# Patient Record
Sex: FEMALE | Race: WHITE | Employment: OTHER | URBAN - METROPOLITAN AREA
[De-identification: names, ages, dates, MRNs, and addresses within clinical notes are randomized per-mention and may not be internally consistent; named-entity substitution may affect disease eponyms.]

---

## 2017-06-01 ENCOUNTER — HOSPITAL ENCOUNTER (OUTPATIENT)
Dept: LAB | Age: 72
Discharge: HOME | End: 2017-06-01
Payer: MEDICARE

## 2017-06-01 LAB
ANION GAP SERPL CALCULATED.3IONS-SCNC: 13 MEQ/L (ref 7–13)
BUN BLDV-MCNC: 13 MG/DL (ref 8–23)
CALCIUM SERPL-MCNC: 9.2 MG/DL (ref 8.6–10.2)
CHLORIDE BLD-SCNC: 99 MEQ/L (ref 98–107)
CO2: 25 MEQ/L (ref 22–29)
CREAT SERPL-MCNC: 0.76 MG/DL (ref 0.5–0.9)
GFR AFRICAN AMERICAN: >60
GFR NON-AFRICAN AMERICAN: >60
GLUCOSE BLD-MCNC: 94 MG/DL (ref 74–109)
POTASSIUM SERPL-SCNC: 3.7 MEQ/L (ref 3.5–5.1)
SODIUM BLD-SCNC: 137 MEQ/L (ref 132–144)
TSH SERPL DL<=0.05 MIU/L-ACNC: 2.65 UIU/ML (ref 0.27–4.2)

## 2017-06-01 PROCEDURE — 36415 COLL VENOUS BLD VENIPUNCTURE: CPT

## 2017-06-01 PROCEDURE — 84443 ASSAY THYROID STIM HORMONE: CPT

## 2017-06-01 PROCEDURE — 80048 BASIC METABOLIC PNL TOTAL CA: CPT

## 2017-07-08 ENCOUNTER — APPOINTMENT (OUTPATIENT)
Dept: GENERAL RADIOLOGY | Age: 72
End: 2017-07-08
Payer: MEDICARE

## 2017-07-08 ENCOUNTER — HOSPITAL ENCOUNTER (EMERGENCY)
Age: 72
Discharge: ANOTHER HOSPITAL | End: 2017-07-09
Attending: EMERGENCY MEDICINE
Payer: MEDICARE

## 2017-07-08 DIAGNOSIS — R07.9 CHEST PAIN, UNSPECIFIED TYPE: Primary | ICD-10-CM

## 2017-07-08 DIAGNOSIS — R00.1 BRADYCARDIA: ICD-10-CM

## 2017-07-08 PROCEDURE — 99285 EMERGENCY DEPT VISIT HI MDM: CPT

## 2017-07-08 PROCEDURE — 93005 ELECTROCARDIOGRAM TRACING: CPT

## 2017-07-08 PROCEDURE — 71010 XR CHEST PORTABLE: CPT

## 2017-07-08 PROCEDURE — 84484 ASSAY OF TROPONIN QUANT: CPT

## 2017-07-08 PROCEDURE — 85025 COMPLETE CBC W/AUTO DIFF WBC: CPT

## 2017-07-08 PROCEDURE — 36415 COLL VENOUS BLD VENIPUNCTURE: CPT

## 2017-07-08 PROCEDURE — 80048 BASIC METABOLIC PNL TOTAL CA: CPT

## 2017-07-08 PROCEDURE — 83735 ASSAY OF MAGNESIUM: CPT

## 2017-07-08 PROCEDURE — 85379 FIBRIN DEGRADATION QUANT: CPT

## 2017-07-08 PROCEDURE — 83880 ASSAY OF NATRIURETIC PEPTIDE: CPT

## 2017-07-08 RX ORDER — NITROGLYCERIN 0.4 MG/1
0.4 TABLET SUBLINGUAL EVERY 5 MIN PRN
Status: DISCONTINUED | OUTPATIENT
Start: 2017-07-08 | End: 2017-07-09 | Stop reason: HOSPADM

## 2017-07-08 RX ORDER — 0.9 % SODIUM CHLORIDE 0.9 %
1000 INTRAVENOUS SOLUTION INTRAVENOUS ONCE
Status: COMPLETED | OUTPATIENT
Start: 2017-07-08 | End: 2017-07-09

## 2017-07-08 RX ORDER — ASPIRIN 81 MG/1
324 TABLET, CHEWABLE ORAL ONCE
Status: COMPLETED | OUTPATIENT
Start: 2017-07-08 | End: 2017-07-09

## 2017-07-08 ASSESSMENT — ENCOUNTER SYMPTOMS
RHINORRHEA: 0
CONSTIPATION: 0
SINUS PRESSURE: 0
PHOTOPHOBIA: 0
WHEEZING: 0
APNEA: 0
VOMITING: 0
SHORTNESS OF BREATH: 0
NAUSEA: 0
BACK PAIN: 0
DIARRHEA: 0
ABDOMINAL PAIN: 0
ABDOMINAL DISTENTION: 0
SORE THROAT: 0
COUGH: 0
COLOR CHANGE: 0
EYE PAIN: 0

## 2017-07-08 ASSESSMENT — PAIN DESCRIPTION - ONSET: ONSET: SUDDEN

## 2017-07-08 ASSESSMENT — PAIN DESCRIPTION - PAIN TYPE: TYPE: ACUTE PAIN

## 2017-07-08 ASSESSMENT — PAIN DESCRIPTION - PROGRESSION: CLINICAL_PROGRESSION: GRADUALLY WORSENING

## 2017-07-08 ASSESSMENT — PAIN SCALES - GENERAL: PAINLEVEL_OUTOF10: 6

## 2017-07-08 ASSESSMENT — PAIN DESCRIPTION - FREQUENCY: FREQUENCY: CONTINUOUS

## 2017-07-08 ASSESSMENT — PAIN DESCRIPTION - DESCRIPTORS: DESCRIPTORS: ACHING

## 2017-07-08 ASSESSMENT — PAIN DESCRIPTION - LOCATION: LOCATION: CHEST

## 2017-07-08 ASSESSMENT — PAIN DESCRIPTION - ORIENTATION: ORIENTATION: LEFT

## 2017-07-09 ENCOUNTER — APPOINTMENT (OUTPATIENT)
Dept: ULTRASOUND IMAGING | Age: 72
DRG: 313 | End: 2017-07-09
Attending: INTERNAL MEDICINE
Payer: MEDICARE

## 2017-07-09 ENCOUNTER — HOSPITAL ENCOUNTER (INPATIENT)
Age: 72
LOS: 1 days | Discharge: HOME | DRG: 313 | End: 2017-07-10
Attending: INTERNAL MEDICINE | Admitting: INTERNAL MEDICINE
Payer: MEDICARE

## 2017-07-09 VITALS
HEART RATE: 54 BPM | BODY MASS INDEX: 28.35 KG/M2 | HEIGHT: 63 IN | DIASTOLIC BLOOD PRESSURE: 69 MMHG | TEMPERATURE: 98.6 F | OXYGEN SATURATION: 95 % | RESPIRATION RATE: 16 BRPM | SYSTOLIC BLOOD PRESSURE: 159 MMHG | WEIGHT: 160 LBS

## 2017-07-09 LAB
ANION GAP SERPL CALCULATED.3IONS-SCNC: 14 MEQ/L (ref 7–13)
BACTERIA: ABNORMAL /HPF
BASOPHILS ABSOLUTE: 0.1 K/UL (ref 0–0.2)
BASOPHILS RELATIVE PERCENT: 1.5 %
BILIRUBIN URINE: NEGATIVE
BLOOD, URINE: NORMAL
BUN BLDV-MCNC: 16 MG/DL (ref 8–23)
CALCIUM SERPL-MCNC: 9.3 MG/DL (ref 8.6–10.2)
CHLORIDE BLD-SCNC: 93 MEQ/L (ref 98–107)
CHOLESTEROL, TOTAL: 146 MG/DL (ref 0–199)
CLARITY: CLEAR
CO2: 28 MEQ/L (ref 22–29)
COLOR: YELLOW
CREAT SERPL-MCNC: 0.93 MG/DL (ref 0.5–0.9)
D DIMER: 0.57 MG/L FEU (ref 0–0.5)
EOSINOPHILS ABSOLUTE: 0.2 K/UL (ref 0–0.7)
EOSINOPHILS RELATIVE PERCENT: 2.7 %
EPITHELIAL CELLS, UA: ABNORMAL /HPF
GFR AFRICAN AMERICAN: >60
GFR NON-AFRICAN AMERICAN: 59.1
GLUCOSE BLD-MCNC: 117 MG/DL (ref 74–109)
GLUCOSE URINE: NEGATIVE MG/DL
HCT VFR BLD CALC: 37.6 % (ref 37–47)
HDLC SERPL-MCNC: 80 MG/DL (ref 40–59)
HEMOGLOBIN: 12.7 G/DL (ref 12–16)
KETONES, URINE: NEGATIVE MG/DL
LDL CHOLESTEROL CALCULATED: 52 MG/DL (ref 0–129)
LEUKOCYTE ESTERASE, URINE: NORMAL
LYMPHOCYTES ABSOLUTE: 2.4 K/UL (ref 1–4.8)
LYMPHOCYTES RELATIVE PERCENT: 34.8 %
MAGNESIUM: 2 MG/DL (ref 1.7–2.3)
MCH RBC QN AUTO: 31.2 PG (ref 27–31.3)
MCHC RBC AUTO-ENTMCNC: 33.8 % (ref 33–37)
MCV RBC AUTO: 92.3 FL (ref 82–100)
MONOCYTES ABSOLUTE: 0.4 K/UL (ref 0.2–0.8)
MONOCYTES RELATIVE PERCENT: 6.6 %
NEUTROPHILS ABSOLUTE: 3.7 K/UL (ref 1.4–6.5)
NEUTROPHILS RELATIVE PERCENT: 54.4 %
NITRITE, URINE: NEGATIVE
PDW BLD-RTO: 14 % (ref 11.5–14.5)
PH UA: 5.5 (ref 5–9)
PLATELET # BLD: 261 K/UL (ref 130–400)
POTASSIUM SERPL-SCNC: 3.5 MEQ/L (ref 3.5–5.1)
PRO-BNP: 98 PG/ML
PROTEIN UA: NEGATIVE MG/DL
RBC # BLD: 4.07 M/UL (ref 4.2–5.4)
RBC UA: ABNORMAL /HPF (ref 0–2)
RENAL EPITHELIAL, UA: ABNORMAL /HPF
SODIUM BLD-SCNC: 135 MEQ/L (ref 132–144)
SPECIFIC GRAVITY UA: <=1.005 (ref 1–1.03)
TRIGL SERPL-MCNC: 70 MG/DL (ref 0–200)
TROPONIN: <0.01 NG/ML (ref 0–0.01)
URINE REFLEX TO CULTURE: YES
UROBILINOGEN, URINE: 0.2 E.U./DL
WBC # BLD: 6.8 K/UL (ref 4.8–10.8)
WBC UA: ABNORMAL /HPF (ref 0–5)

## 2017-07-09 PROCEDURE — 93005 ELECTROCARDIOGRAM TRACING: CPT

## 2017-07-09 PROCEDURE — 6370000000 HC RX 637 (ALT 250 FOR IP): Performed by: INTERNAL MEDICINE

## 2017-07-09 PROCEDURE — 81001 URINALYSIS AUTO W/SCOPE: CPT

## 2017-07-09 PROCEDURE — 2060000000 HC ICU INTERMEDIATE R&B

## 2017-07-09 PROCEDURE — 36415 COLL VENOUS BLD VENIPUNCTURE: CPT

## 2017-07-09 PROCEDURE — 93970 EXTREMITY STUDY: CPT

## 2017-07-09 PROCEDURE — 6370000000 HC RX 637 (ALT 250 FOR IP): Performed by: EMERGENCY MEDICINE

## 2017-07-09 PROCEDURE — 87086 URINE CULTURE/COLONY COUNT: CPT

## 2017-07-09 PROCEDURE — 2140000000 HC CCU INTERMEDIATE R&B

## 2017-07-09 PROCEDURE — 2580000003 HC RX 258: Performed by: EMERGENCY MEDICINE

## 2017-07-09 PROCEDURE — 84484 ASSAY OF TROPONIN QUANT: CPT

## 2017-07-09 PROCEDURE — 80061 LIPID PANEL: CPT

## 2017-07-09 RX ORDER — BUPROPION HYDROCHLORIDE 150 MG/1
150 TABLET ORAL EVERY MORNING
Status: DISCONTINUED | OUTPATIENT
Start: 2017-07-09 | End: 2017-07-11 | Stop reason: HOSPADM

## 2017-07-09 RX ORDER — DIPHENHYDRAMINE HCL 25 MG
25 TABLET ORAL ONCE
Status: COMPLETED | OUTPATIENT
Start: 2017-07-09 | End: 2017-07-09

## 2017-07-09 RX ORDER — CLOPIDOGREL BISULFATE 75 MG/1
50 TABLET ORAL ONCE
Status: DISCONTINUED | OUTPATIENT
Start: 2017-07-09 | End: 2017-07-09

## 2017-07-09 RX ORDER — FAMOTIDINE 20 MG/1
40 TABLET, FILM COATED ORAL ONCE
Status: COMPLETED | OUTPATIENT
Start: 2017-07-10 | End: 2017-07-10

## 2017-07-09 RX ORDER — MORPHINE SULFATE 4 MG/ML
4 INJECTION, SOLUTION INTRAMUSCULAR; INTRAVENOUS EVERY 4 HOURS PRN
Status: DISCONTINUED | OUTPATIENT
Start: 2017-07-09 | End: 2017-07-11 | Stop reason: HOSPADM

## 2017-07-09 RX ORDER — LOSARTAN POTASSIUM 50 MG/1
50 TABLET ORAL DAILY
Status: DISCONTINUED | OUTPATIENT
Start: 2017-07-09 | End: 2017-07-11 | Stop reason: HOSPADM

## 2017-07-09 RX ORDER — DIPHENHYDRAMINE HCL 25 MG
25 TABLET ORAL ONCE
Status: COMPLETED | OUTPATIENT
Start: 2017-07-10 | End: 2017-07-10

## 2017-07-09 RX ORDER — PREDNISONE 20 MG/1
20 TABLET ORAL DAILY
Status: DISCONTINUED | OUTPATIENT
Start: 2017-07-09 | End: 2017-07-11 | Stop reason: HOSPADM

## 2017-07-09 RX ORDER — ALPRAZOLAM 0.25 MG/1
0.25 TABLET ORAL 2 TIMES DAILY PRN
Status: DISCONTINUED | OUTPATIENT
Start: 2017-07-09 | End: 2017-07-11 | Stop reason: HOSPADM

## 2017-07-09 RX ORDER — FAMOTIDINE 20 MG/1
40 TABLET, FILM COATED ORAL ONCE
Status: COMPLETED | OUTPATIENT
Start: 2017-07-09 | End: 2017-07-09

## 2017-07-09 RX ORDER — PREDNISONE 20 MG/1
20 TABLET ORAL ONCE
Status: COMPLETED | OUTPATIENT
Start: 2017-07-10 | End: 2017-07-10

## 2017-07-09 RX ORDER — CLOPIDOGREL BISULFATE 75 MG/1
75 TABLET ORAL DAILY
Status: DISCONTINUED | OUTPATIENT
Start: 2017-07-09 | End: 2017-07-11 | Stop reason: HOSPADM

## 2017-07-09 RX ORDER — ZOLPIDEM TARTRATE 5 MG/1
5 TABLET ORAL ONCE
Status: COMPLETED | OUTPATIENT
Start: 2017-07-09 | End: 2017-07-09

## 2017-07-09 RX ORDER — SIMVASTATIN 20 MG
20 TABLET ORAL NIGHTLY
Status: DISCONTINUED | OUTPATIENT
Start: 2017-07-09 | End: 2017-07-11 | Stop reason: HOSPADM

## 2017-07-09 RX ORDER — ASPIRIN 81 MG/1
81 TABLET, CHEWABLE ORAL DAILY
Status: DISCONTINUED | OUTPATIENT
Start: 2017-07-09 | End: 2017-07-11 | Stop reason: HOSPADM

## 2017-07-09 RX ADMIN — BUPROPION HYDROCHLORIDE 150 MG: 150 TABLET, FILM COATED, EXTENDED RELEASE ORAL at 08:17

## 2017-07-09 RX ADMIN — Medication 15 ML: at 12:27

## 2017-07-09 RX ADMIN — FAMOTIDINE 40 MG: 20 TABLET ORAL at 20:23

## 2017-07-09 RX ADMIN — ASPIRIN 81 MG 81 MG: 81 TABLET ORAL at 08:18

## 2017-07-09 RX ADMIN — SIMVASTATIN 20 MG: 20 TABLET, FILM COATED ORAL at 20:24

## 2017-07-09 RX ADMIN — DIPHENHYDRAMINE HCL 25 MG: 25 TABLET ORAL at 20:24

## 2017-07-09 RX ADMIN — ALPRAZOLAM 0.25 MG: 0.25 TABLET ORAL at 18:20

## 2017-07-09 RX ADMIN — CLOPIDOGREL BISULFATE 75 MG: 75 TABLET ORAL at 08:18

## 2017-07-09 RX ADMIN — LOSARTAN POTASSIUM 50 MG: 50 TABLET, FILM COATED ORAL at 08:18

## 2017-07-09 RX ADMIN — PREDNISONE 20 MG: 20 TABLET ORAL at 18:20

## 2017-07-09 RX ADMIN — SODIUM CHLORIDE 1000 ML: 9 INJECTION, SOLUTION INTRAVENOUS at 00:00

## 2017-07-09 RX ADMIN — ZOLPIDEM TARTRATE 5 MG: 5 TABLET ORAL at 23:32

## 2017-07-09 RX ADMIN — ASPIRIN 81 MG CHEWABLE TABLET 324 MG: 81 TABLET CHEWABLE at 00:00

## 2017-07-09 ASSESSMENT — PAIN DESCRIPTION - PROGRESSION: CLINICAL_PROGRESSION: NOT CHANGED

## 2017-07-09 ASSESSMENT — PAIN SCALES - GENERAL
PAINLEVEL_OUTOF10: 0
PAINLEVEL_OUTOF10: 1
PAINLEVEL_OUTOF10: 0
PAINLEVEL_OUTOF10: 0
PAINLEVEL_OUTOF10: 2
PAINLEVEL_OUTOF10: 0
PAINLEVEL_OUTOF10: 1
PAINLEVEL_OUTOF10: 0

## 2017-07-09 ASSESSMENT — ENCOUNTER SYMPTOMS
RESPIRATORY NEGATIVE: 1
GASTROINTESTINAL NEGATIVE: 1
EYES NEGATIVE: 1
ORTHOPNEA: 0

## 2017-07-09 ASSESSMENT — PAIN DESCRIPTION - FREQUENCY: FREQUENCY: INTERMITTENT

## 2017-07-09 ASSESSMENT — PAIN DESCRIPTION - ONSET: ONSET: GRADUAL

## 2017-07-09 ASSESSMENT — PAIN DESCRIPTION - PAIN TYPE: TYPE: ACUTE PAIN

## 2017-07-09 ASSESSMENT — PAIN DESCRIPTION - LOCATION: LOCATION: CHEST

## 2017-07-09 NOTE — ED PROVIDER NOTES
[address] [place] [address] ED  eMERGENCY dEPARTMENT eNCOUnter      Pt Name: Zafar Harden  MRN: [id number]  [place] 1945  Date of evaluation: 7/8/2017  Provider: Junaid Ellison MD    CHIEF COMPLAINT       Chief Complaint   Patient presents with    Chest Pain         HISTORY OF PRESENT ILLNESS   (Location/Symptom, Timing/Onset, Context/Setting, Quality, Duration, Modifying Factors, Severity)  Note limiting factors. Zafar Harden is a 67 y.o. female who presents to the emergency department with complaint of chest pain which began about 2 hours ago. Patient describes pain as sharp and under her left breast.  She denies any radiation of pain. She denied associated shortness of breath, palpitations, diaphoresis or dizziness. She had any problems with chest pain in the past.  She quit smoking about 20 years ago. Pain is persistent,  6 in a scale of 1-10. HPI    Nursing Notes were reviewed. REVIEW OF SYSTEMS    (2-9 systems for level 4, 10 or more for level 5)     Review of Systems   Constitutional: Negative. Negative for activity change, appetite change, chills, fatigue and fever. HENT: Negative for congestion, ear discharge, ear pain, hearing loss, rhinorrhea, sinus pressure and sore throat. Eyes: Negative for photophobia, pain and visual disturbance. Respiratory: Negative for apnea, cough, shortness of breath and wheezing. Cardiovascular: Positive for chest pain. Negative for palpitations and leg swelling. Gastrointestinal: Negative for abdominal distention, abdominal pain, constipation, diarrhea, nausea and vomiting. Endocrine: Negative for cold intolerance, heat intolerance and polyuria. Genitourinary: Negative for dysuria, flank pain, frequency and urgency. Musculoskeletal: Negative for arthralgias, back pain, gait problem, myalgias and neck stiffness. Skin: Negative for color change, pallor and rash.    Allergic/Immunologic: Negative for food allergies and immunocompromised state. Neurological: Negative for dizziness, tremors, syncope, weakness, light-headedness and headaches. Psychiatric/Behavioral: Negative for agitation, confusion and hallucinations. All other systems reviewed and are negative. Except as noted above the remainder of the review of systems was reviewed and negative. PAST MEDICAL HISTORY     Past Medical History:   Diagnosis Date    Chronic cough     Depression     Diverticulosis     Essential hypertension     somewhat labile, but overall stable    Hyperlipidemia     Type2A    Low bone mass     spine    Menopause     Reflux     chronic with periodic dysphagia    Sedentary lifestyle     Sleep apnea          SURGICAL HISTORY       Past Surgical History:   Procedure Laterality Date    CERVICAL DISC SURGERY      ESOPHAGEAL DILATATION  095462    LUMBAR [place] [employer] SURGERY  326018    TONSILLECTOMY           CURRENT MEDICATIONS       Discharge Medication List as of 7/9/2017  3:26 AM      CONTINUE these medications which have NOT CHANGED    Details   Clopidogrel Bisulfate (PLAVIX PO) Take 50 mg by mouth       losartan (COZAAR) 50 MG tablet R-0      traZODone (DESYREL) 50 MG tablet R-9      Melatonin 10 MG CAPS Take by mouth      CREON 31448 UNITS CPEP R-3, LAINA      CARAFATE 1 GM/10ML suspension R-0, LAINA      potassium chloride (POTASSIUM CHLORIDE) 10 MEQ tablet Take 1 tablet by mouth daily. , Disp-90 tablet, R-3      traMADol (ULTRAM) 50 MG tablet Take 1 tablet by mouth every 8 hours as needed for Pain., Disp-40 tablet, R-1      Calcium Carbonate-Vitamin D (CALCIUM 600+D) 600-200 MG-UNIT TABS Take  by mouth 2 times daily. omeprazole (PRILOSEC) 40 MG capsule Take 40 mg by mouth daily.         loperamide (IMODIUM A-D) 2 MG tablet Take 2 mg by mouth      simvastatin (ZOCOR) 20 MG tablet R-1      predniSONE (DELTASONE) 10 MG tablet 1 tab TID x 3 days, 1 tab BID x 3 days, 1 tab QD x 3 days, Disp-18 tablet, R-0      amitriptyline (ELAVIL) 10 MG tablet Take 1 tablet by mouth nightly., Disp-90 tablet, R-3      hydrochlorothiazide (HYDRODIURIL) 25 MG tablet Take 1 tablet by mouth daily. , Disp-90 tablet, R-3      LORazepam (ATIVAN) 0.5 MG tablet Take 1 tablet by mouth daily as needed for Anxiety. , Disp-30 tablet, R-1      buPROPion (WELLBUTRIN XL) 300 MG XL tablet Take 1 tablet by mouth every morning., Disp-30 tablet, R-2      busPIRone (BUSPAR) 15 MG tablet Take 1 tablet by mouth 2 times daily. , Disp-60 tablet, R-0      vitamin D (ERGOCALCIFEROL) 25812 UNITS CAPS capsule Take 1 capsule by mouth every 14 days. , Disp-6 capsule, R-1      ALPRAZolam (XANAX) 0.5 MG tablet Take 0.5 tablets by mouth 2 times daily as needed for Anxiety. , Disp-30 tablet, R-2      zolpidem (AMBIEN) 5 MG tablet One hs prn insomnia., Disp-10 tablet, R-1      gabapentin (NEURONTIN) 400 MG capsule Take 400 mg by mouth See Admin Instructions. 2 qhs              ALLERGIES     Betadine [povidone iodine]; Celebrex [celecoxib]; Contrast [iodides]; Mercury;  Other; Pcn [penicillins]; and Zinc    FAMILY HISTORY       Family History   Problem Relation Age of Onset    Arthritis Mother     Cancer Father      breast    Memory Loss Father      short term          SOCIAL HISTORY       Social History     Social History    Marital status:      Spouse name: N/A    Number of children: N/A    Years of education: N/A     Social History Main Topics    Smoking status: Former Smoker     Quit date: 1/1/1997    Smokeless tobacco: Never Used    Alcohol use 1.2 oz/week     2 Glasses of wine per week      Comment: Daily    Drug use: No    Sexual activity: Not Asked     Other Topics Concern    None     Social History Narrative       SCREENINGS             PHYSICAL EXAM    (up to 7 for level 4, 8 or more for level 5)   ED Triage Vitals   BP Temp Temp Source Pulse Resp SpO2 Height Weight   07/08/17 2311 07/08/17 2311 07/08/17 2311 07/08/17 2311 07/08/17 2311 07/08/17 2311 07/08/17 2311 wave changes. RADIOLOGY:   Non-plain film images such as CT, Ultrasound and MRI are read by the radiologist. Plain radiographic images are visualized and preliminarily interpreted by the emergency physician with the below findings:    Chest x-ray showed no acute cardio pulmonary pathology    Interpretation per the Radiologist below, if available at the time of this note:    XR Chest Portable   Final Result   NO ACUTE CARDIOPULMONARY ABNORMALITY. ED BEDSIDE ULTRASOUND:   Performed by ED Physician - none    LABS:  Labs Reviewed   BASIC METABOLIC PANEL - Abnormal; Notable for the following:        Result Value    Chloride 93 (*)     Anion Gap 14 (*)     Glucose 117 (*)     CREATININE 0.93 (*)     GFR Non- 59.1 (*)     All other components within normal limits   CBC WITH AUTO DIFFERENTIAL - Abnormal; Notable for the following:     RBC 4.07 (*)     All other components within normal limits   D-DIMER, QUANTITATIVE - Abnormal; Notable for the following:     D-Dimer, Quant 0.57 (*)     All other components within normal limits   MICROSCOPIC URINALYSIS - Abnormal; Notable for the following:     WBC, UA 10-20 (*)     RBC, UA 10-20 (*)     All other components within normal limits   URINE CULTURE    Narrative:     ORDER#: [id number]                          ORDERED BY: Raghav Story  SOURCE: Urine Clean Catch                  COLLECTED:  07/09/17 00:35  ANTIBIOTICS AT DEVEN.:                      RECEIVED :  07/09/17 16:10   MAGNESIUM   URINE RT REFLEX TO CULTURE   TROPONIN   BRAIN NATRIURETIC PEPTIDE       All other labs were within normal range or not returned as of this dictation. EMERGENCY DEPARTMENT COURSE and DIFFERENTIAL DIAGNOSIS/MDM:    Patient's chest pain resolved with nitro sublingual .  Heart rate dropped in the 30s during  ED course . Case was discussed with Dr. Sangeetha Hawthorne hospitalist [facility].    She was accepted in transfer to  the ICU for continuing care .     Care

## 2017-07-09 NOTE — ED TRIAGE NOTES
Pt presents to ED c/o chest pain that started tonight about 1930. Pt denies any shortness of breath or nausea. Patient alert and oriented x4 and speaking in complete sentence with regular unlabored respirations during triage.

## 2017-07-09 NOTE — ED NOTES
Emmy Milner with [facility] called and gave me the bed assignment ICU bed 8-1 [id number] [address] [facility], nurse report [phone] at 2:10.     Edda Castro  07/09/17 021

## 2017-07-10 ENCOUNTER — APPOINTMENT (OUTPATIENT)
Dept: CT IMAGING | Age: 72
DRG: 313 | End: 2017-07-10
Attending: INTERNAL MEDICINE
Payer: MEDICARE

## 2017-07-10 ENCOUNTER — APPOINTMENT (OUTPATIENT)
Dept: NUCLEAR MEDICINE | Age: 72
DRG: 313 | End: 2017-07-10
Attending: INTERNAL MEDICINE
Payer: MEDICARE

## 2017-07-10 VITALS
RESPIRATION RATE: 18 BRPM | BODY MASS INDEX: 30.2 KG/M2 | SYSTOLIC BLOOD PRESSURE: 150 MMHG | OXYGEN SATURATION: 97 % | DIASTOLIC BLOOD PRESSURE: 51 MMHG | HEIGHT: 63 IN | HEART RATE: 94 BPM | TEMPERATURE: 98.1 F | WEIGHT: 170.42 LBS

## 2017-07-10 LAB
LV EF: 50 %
LVEF MODALITY: NORMAL

## 2017-07-10 PROCEDURE — 78452 HT MUSCLE IMAGE SPECT MULT: CPT

## 2017-07-10 PROCEDURE — 6370000000 HC RX 637 (ALT 250 FOR IP): Performed by: INTERNAL MEDICINE

## 2017-07-10 PROCEDURE — 6360000002 HC RX W HCPCS: Performed by: INTERNAL MEDICINE

## 2017-07-10 PROCEDURE — 6360000004 HC RX CONTRAST MEDICATION: Performed by: RADIOLOGY

## 2017-07-10 PROCEDURE — A9502 TC99M TETROFOSMIN: HCPCS | Performed by: INTERNAL MEDICINE

## 2017-07-10 PROCEDURE — 93017 CV STRESS TEST TRACING ONLY: CPT

## 2017-07-10 PROCEDURE — 93306 TTE W/DOPPLER COMPLETE: CPT

## 2017-07-10 PROCEDURE — 3430000000 HC RX DIAGNOSTIC RADIOPHARMACEUTICAL: Performed by: INTERNAL MEDICINE

## 2017-07-10 PROCEDURE — 2580000003 HC RX 258: Performed by: INTERNAL MEDICINE

## 2017-07-10 PROCEDURE — 71275 CT ANGIOGRAPHY CHEST: CPT

## 2017-07-10 RX ORDER — SODIUM CHLORIDE 0.9 % (FLUSH) 0.9 %
10 SYRINGE (ML) INJECTION 2 TIMES DAILY
Status: DISCONTINUED | OUTPATIENT
Start: 2017-07-10 | End: 2017-07-11 | Stop reason: HOSPADM

## 2017-07-10 RX ADMIN — PREDNISONE 20 MG: 20 TABLET ORAL at 06:05

## 2017-07-10 RX ADMIN — CLOPIDOGREL BISULFATE 75 MG: 75 TABLET ORAL at 08:08

## 2017-07-10 RX ADMIN — ALPRAZOLAM 0.25 MG: 0.25 TABLET ORAL at 15:58

## 2017-07-10 RX ADMIN — TETROFOSMIN 34.7 MILLICURIE: 0.23 INJECTION, POWDER, LYOPHILIZED, FOR SOLUTION INTRAVENOUS at 10:39

## 2017-07-10 RX ADMIN — REGADENOSON 0.4 MG: 0.08 INJECTION, SOLUTION INTRAVENOUS at 10:39

## 2017-07-10 RX ADMIN — BUPROPION HYDROCHLORIDE 150 MG: 150 TABLET, FILM COATED, EXTENDED RELEASE ORAL at 08:08

## 2017-07-10 RX ADMIN — ASPIRIN 81 MG 81 MG: 81 TABLET ORAL at 08:08

## 2017-07-10 RX ADMIN — PREDNISONE 20 MG: 20 TABLET ORAL at 08:08

## 2017-07-10 RX ADMIN — FAMOTIDINE 40 MG: 20 TABLET ORAL at 08:08

## 2017-07-10 RX ADMIN — PREDNISONE 20 MG: 20 TABLET ORAL at 08:53

## 2017-07-10 RX ADMIN — Medication 30 ML: at 10:48

## 2017-07-10 RX ADMIN — DIPHENHYDRAMINE HCL 25 MG: 25 TABLET ORAL at 08:09

## 2017-07-10 RX ADMIN — TETROFOSMIN 11.7 MILLICURIE: 0.23 INJECTION, POWDER, LYOPHILIZED, FOR SOLUTION INTRAVENOUS at 09:30

## 2017-07-10 RX ADMIN — LOSARTAN POTASSIUM 50 MG: 50 TABLET, FILM COATED ORAL at 08:09

## 2017-07-10 RX ADMIN — Medication 10 ML: at 09:38

## 2017-07-10 RX ADMIN — IOPAMIDOL 100 ML: 755 INJECTION, SOLUTION INTRAVENOUS at 09:56

## 2017-07-10 ASSESSMENT — PAIN SCALES - GENERAL
PAINLEVEL_OUTOF10: 0

## 2017-07-11 LAB — URINE CULTURE, ROUTINE: NORMAL

## 2017-07-13 LAB
EKG ATRIAL RATE: 55 BPM
EKG P AXIS: 66 DEGREES
EKG P-R INTERVAL: 200 MS
EKG Q-T INTERVAL: 490 MS
EKG QRS DURATION: 160 MS
EKG QTC CALCULATION (BAZETT): 468 MS
EKG R AXIS: 9 DEGREES
EKG T AXIS: 16 DEGREES
EKG VENTRICULAR RATE: 55 BPM

## 2017-07-25 LAB
EKG ATRIAL RATE: 61 BPM
EKG P AXIS: 54 DEGREES
EKG P-R INTERVAL: 164 MS
EKG Q-T INTERVAL: 474 MS
EKG QRS DURATION: 142 MS
EKG QTC CALCULATION (BAZETT): 477 MS
EKG R AXIS: 0 DEGREES
EKG T AXIS: 4 DEGREES
EKG VENTRICULAR RATE: 61 BPM

## 2017-09-10 ASSESSMENT — ENCOUNTER SYMPTOMS
ABDOMINAL PAIN: 0
NAUSEA: 0
COUGH: 0
EYE PAIN: 0
CONSTIPATION: 0
SINUS PRESSURE: 0
SORE THROAT: 0
COLOR CHANGE: 0
WHEEZING: 0
BACK PAIN: 0
RHINORRHEA: 0
PHOTOPHOBIA: 0
VOMITING: 0
DIARRHEA: 0
SHORTNESS OF BREATH: 0
ABDOMINAL DISTENTION: 0
APNEA: 0

## 2017-09-20 ENCOUNTER — HOSPITAL ENCOUNTER (OUTPATIENT)
Dept: ULTRASOUND IMAGING | Age: 72
Discharge: HOME | End: 2017-09-20
Payer: MEDICARE

## 2017-09-20 DIAGNOSIS — I65.23 CAROTID STENOSIS, BILATERAL: ICD-10-CM

## 2017-09-20 PROCEDURE — 93880 EXTRACRANIAL BILAT STUDY: CPT

## 2018-01-18 ENCOUNTER — HOSPITAL ENCOUNTER (OUTPATIENT)
Dept: LAB | Age: 73
Discharge: HOME | End: 2018-01-18
Payer: MEDICARE

## 2018-01-18 LAB
ALBUMIN SERPL-MCNC: 4.1 G/DL (ref 3.9–4.9)
ALP BLD-CCNC: 60 U/L (ref 40–130)
ALT SERPL-CCNC: 9 U/L (ref 0–33)
AMMONIA: 37 UMOL/L (ref 11–51)
ANION GAP SERPL CALCULATED.3IONS-SCNC: 15 MEQ/L (ref 7–13)
AST SERPL-CCNC: 15 U/L (ref 0–35)
BILIRUB SERPL-MCNC: 0.8 MG/DL (ref 0–1.2)
BILIRUBIN DIRECT: 0.2 MG/DL (ref 0–0.3)
BILIRUBIN, INDIRECT: 0.6 MG/DL (ref 0–0.6)
BUN BLDV-MCNC: 16 MG/DL (ref 8–23)
C-REACTIVE PROTEIN: 2.9 MG/L (ref 0–5)
CHLORIDE BLD-SCNC: 98 MEQ/L (ref 98–107)
CO2: 27 MEQ/L (ref 22–29)
CREAT SERPL-MCNC: 1.03 MG/DL (ref 0.5–0.9)
FOLATE: 10.1 NG/ML (ref 7.3–26.1)
GFR AFRICAN AMERICAN: >60
GFR NON-AFRICAN AMERICAN: 52.4
HOMOCYSTEINE: 14 UMOL/L (ref 0–15)
POTASSIUM SERPL-SCNC: 4 MEQ/L (ref 3.5–5.1)
SEDIMENTATION RATE, ERYTHROCYTE: 23 MM (ref 0–30)
SODIUM BLD-SCNC: 140 MEQ/L (ref 132–144)
TOTAL PROTEIN: 6.8 G/DL (ref 6.4–8.1)
TSH SERPL DL<=0.05 MIU/L-ACNC: 2.23 UIU/ML (ref 0.27–4.2)
VITAMIN B-12: >2000 PG/ML (ref 211–946)

## 2018-01-18 PROCEDURE — 36415 COLL VENOUS BLD VENIPUNCTURE: CPT

## 2018-01-18 PROCEDURE — 82140 ASSAY OF AMMONIA: CPT

## 2018-01-18 PROCEDURE — 82565 ASSAY OF CREATININE: CPT

## 2018-01-18 PROCEDURE — 82607 VITAMIN B-12: CPT

## 2018-01-18 PROCEDURE — 86617 LYME DISEASE ANTIBODY: CPT

## 2018-01-18 PROCEDURE — 86140 C-REACTIVE PROTEIN: CPT

## 2018-01-18 PROCEDURE — 85652 RBC SED RATE AUTOMATED: CPT

## 2018-01-18 PROCEDURE — 84520 ASSAY OF UREA NITROGEN: CPT

## 2018-01-18 PROCEDURE — 80076 HEPATIC FUNCTION PANEL: CPT

## 2018-01-18 PROCEDURE — 84443 ASSAY THYROID STIM HORMONE: CPT

## 2018-01-18 PROCEDURE — 80051 ELECTROLYTE PANEL: CPT

## 2018-01-18 PROCEDURE — 83090 ASSAY OF HOMOCYSTEINE: CPT

## 2018-01-18 PROCEDURE — 86592 SYPHILIS TEST NON-TREP QUAL: CPT

## 2018-01-18 PROCEDURE — 82746 ASSAY OF FOLIC ACID SERUM: CPT

## 2018-01-19 LAB — RPR: NORMAL

## 2018-01-20 LAB
LYME (B. BURGDORFERI) AB IGG WB: NEGATIVE
LYME AB IGM BY WB:: NEGATIVE

## 2018-02-09 ENCOUNTER — HOSPITAL ENCOUNTER (OUTPATIENT)
Dept: MRI IMAGING | Age: 73
Discharge: HOME | End: 2018-02-11
Payer: MEDICARE

## 2018-02-09 DIAGNOSIS — R41.3 DECLINE IN VERBAL MEMORY: ICD-10-CM

## 2018-02-09 PROCEDURE — 70551 MRI BRAIN STEM W/O DYE: CPT

## 2019-07-25 ENCOUNTER — HOSPITAL ENCOUNTER (OUTPATIENT)
Dept: SLEEP CENTER | Age: 74
Discharge: HOME | End: 2019-07-27
Payer: MEDICARE

## 2019-07-25 PROCEDURE — 95811 POLYSOM 6/>YRS CPAP 4/> PARM: CPT

## 2020-01-17 ENCOUNTER — HOSPITAL ENCOUNTER (INPATIENT)
Age: 75
LOS: 2 days | Discharge: HOME | DRG: 392 | End: 2020-01-20
Attending: INTERNAL MEDICINE | Admitting: INTERNAL MEDICINE
Payer: MEDICARE

## 2020-01-17 ENCOUNTER — HOSPITAL ENCOUNTER (EMERGENCY)
Age: 75
Discharge: HOME | End: 2020-01-17
Attending: EMERGENCY MEDICINE
Payer: MEDICARE

## 2020-01-17 ENCOUNTER — APPOINTMENT (OUTPATIENT)
Dept: CT IMAGING | Age: 75
End: 2020-01-17
Payer: MEDICARE

## 2020-01-17 VITALS
OXYGEN SATURATION: 98 % | HEIGHT: 64 IN | TEMPERATURE: 98.5 F | HEART RATE: 77 BPM | SYSTOLIC BLOOD PRESSURE: 141 MMHG | WEIGHT: 163 LBS | DIASTOLIC BLOOD PRESSURE: 67 MMHG | RESPIRATION RATE: 19 BRPM | BODY MASS INDEX: 27.83 KG/M2

## 2020-01-17 LAB
ALBUMIN SERPL-MCNC: 4.2 G/DL (ref 3.5–4.6)
ALP BLD-CCNC: 62 U/L (ref 40–130)
ALT SERPL-CCNC: 10 U/L (ref 0–33)
AMYLASE: 101 U/L (ref 22–93)
ANION GAP SERPL CALCULATED.3IONS-SCNC: 15 MEQ/L (ref 9–15)
AST SERPL-CCNC: 18 U/L (ref 0–35)
BACTERIA: ABNORMAL /HPF
BASOPHILS ABSOLUTE: 0.1 K/UL (ref 0–0.2)
BASOPHILS RELATIVE PERCENT: 1.2 %
BILIRUB SERPL-MCNC: 0.9 MG/DL (ref 0.2–0.7)
BILIRUBIN URINE: NEGATIVE
BLOOD, URINE: NORMAL
BUN BLDV-MCNC: 24 MG/DL (ref 8–23)
CALCIUM SERPL-MCNC: 10.2 MG/DL (ref 8.5–9.9)
CHLORIDE BLD-SCNC: 93 MEQ/L (ref 95–107)
CLARITY: CLEAR
CO2: 30 MEQ/L (ref 20–31)
COLOR: YELLOW
CREAT SERPL-MCNC: 1.53 MG/DL (ref 0.5–0.9)
EOSINOPHILS ABSOLUTE: 0 K/UL (ref 0–0.7)
EOSINOPHILS RELATIVE PERCENT: 0.2 %
EPITHELIAL CELLS, UA: ABNORMAL /HPF
GFR AFRICAN AMERICAN: 40
GFR NON-AFRICAN AMERICAN: 33
GLOBULIN: 2.8 G/DL (ref 2.3–3.5)
GLUCOSE BLD-MCNC: 145 MG/DL (ref 70–99)
GLUCOSE URINE: NEGATIVE MG/DL
HCT VFR BLD CALC: 40.6 % (ref 37–47)
HEMOGLOBIN: 13.4 G/DL (ref 12–16)
INR BLD: 1
KETONES, URINE: NEGATIVE MG/DL
LEUKOCYTE ESTERASE, URINE: NEGATIVE
LIPASE: 43 U/L (ref 12–95)
LYMPHOCYTES ABSOLUTE: 0.7 K/UL (ref 1–4.8)
LYMPHOCYTES RELATIVE PERCENT: 6.3 %
MCH RBC QN AUTO: 30.5 PG (ref 27–31.3)
MCHC RBC AUTO-ENTMCNC: 32.9 % (ref 33–37)
MCV RBC AUTO: 92.8 FL (ref 82–100)
MONOCYTES ABSOLUTE: 0.2 K/UL (ref 0.2–0.8)
MONOCYTES RELATIVE PERCENT: 2.1 %
NEUTROPHILS ABSOLUTE: 9.6 K/UL (ref 1.4–6.5)
NEUTROPHILS RELATIVE PERCENT: 90.2 %
NITRITE, URINE: NEGATIVE
PDW BLD-RTO: 13.9 % (ref 11.5–14.5)
PH UA: 6 (ref 5–9)
PLATELET # BLD: 295 K/UL (ref 130–400)
POTASSIUM SERPL-SCNC: 3.3 MEQ/L (ref 3.4–4.9)
PROTEIN UA: NEGATIVE MG/DL
PROTHROMBIN TIME: 13.2 SEC (ref 12.3–14.9)
RBC # BLD: 4.38 M/UL (ref 4.2–5.4)
RBC UA: ABNORMAL /HPF (ref 0–2)
SODIUM BLD-SCNC: 138 MEQ/L (ref 135–144)
SPECIFIC GRAVITY UA: 1.01 (ref 1–1.03)
TOTAL PROTEIN: 7 G/DL (ref 6.3–8)
URINE REFLEX TO CULTURE: YES
UROBILINOGEN, URINE: 0.2 E.U./DL
WBC # BLD: 10.6 K/UL (ref 4.8–10.8)
WBC UA: ABNORMAL /HPF (ref 0–5)

## 2020-01-17 PROCEDURE — 74177 CT ABD & PELVIS W/CONTRAST: CPT

## 2020-01-17 PROCEDURE — 83690 ASSAY OF LIPASE: CPT

## 2020-01-17 PROCEDURE — 96375 TX/PRO/DX INJ NEW DRUG ADDON: CPT

## 2020-01-17 PROCEDURE — 36415 COLL VENOUS BLD VENIPUNCTURE: CPT

## 2020-01-17 PROCEDURE — 85610 PROTHROMBIN TIME: CPT

## 2020-01-17 PROCEDURE — G0378 HOSPITAL OBSERVATION PER HR: HCPCS

## 2020-01-17 PROCEDURE — 87086 URINE CULTURE/COLONY COUNT: CPT

## 2020-01-17 PROCEDURE — G0379 DIRECT REFER HOSPITAL OBSERV: HCPCS

## 2020-01-17 PROCEDURE — 6360000002 HC RX W HCPCS: Performed by: EMERGENCY MEDICINE

## 2020-01-17 PROCEDURE — 6360000004 HC RX CONTRAST MEDICATION: Performed by: EMERGENCY MEDICINE

## 2020-01-17 PROCEDURE — 85025 COMPLETE CBC W/AUTO DIFF WBC: CPT

## 2020-01-17 PROCEDURE — 96365 THER/PROPH/DIAG IV INF INIT: CPT

## 2020-01-17 PROCEDURE — 2500000003 HC RX 250 WO HCPCS: Performed by: EMERGENCY MEDICINE

## 2020-01-17 PROCEDURE — 2580000003 HC RX 258: Performed by: EMERGENCY MEDICINE

## 2020-01-17 PROCEDURE — 99285 EMERGENCY DEPT VISIT HI MDM: CPT

## 2020-01-17 PROCEDURE — 96366 THER/PROPH/DIAG IV INF ADDON: CPT

## 2020-01-17 PROCEDURE — 82150 ASSAY OF AMYLASE: CPT

## 2020-01-17 PROCEDURE — 81001 URINALYSIS AUTO W/SCOPE: CPT

## 2020-01-17 PROCEDURE — 80053 COMPREHEN METABOLIC PANEL: CPT

## 2020-01-17 PROCEDURE — 6370000000 HC RX 637 (ALT 250 FOR IP): Performed by: EMERGENCY MEDICINE

## 2020-01-17 RX ORDER — 0.9 % SODIUM CHLORIDE 0.9 %
500 INTRAVENOUS SOLUTION INTRAVENOUS ONCE
Status: COMPLETED | OUTPATIENT
Start: 2020-01-17 | End: 2020-01-17

## 2020-01-17 RX ORDER — SODIUM CHLORIDE 9 MG/ML
INJECTION, SOLUTION INTRAVENOUS CONTINUOUS
Status: DISCONTINUED | OUTPATIENT
Start: 2020-01-17 | End: 2020-01-17 | Stop reason: HOSPADM

## 2020-01-17 RX ORDER — POTASSIUM CHLORIDE 20 MEQ/1
20 TABLET, EXTENDED RELEASE ORAL ONCE
Status: COMPLETED | OUTPATIENT
Start: 2020-01-17 | End: 2020-01-17

## 2020-01-17 RX ORDER — POTASSIUM CHLORIDE 20 MEQ/1
20 TABLET, EXTENDED RELEASE ORAL ONCE
Status: DISCONTINUED | OUTPATIENT
Start: 2020-01-17 | End: 2020-01-17

## 2020-01-17 RX ORDER — SODIUM CHLORIDE 0.9 % (FLUSH) 0.9 %
3 SYRINGE (ML) INJECTION EVERY 8 HOURS
Status: DISCONTINUED | OUTPATIENT
Start: 2020-01-17 | End: 2020-01-17 | Stop reason: HOSPADM

## 2020-01-17 RX ORDER — DIPHENHYDRAMINE HYDROCHLORIDE 50 MG/ML
25 INJECTION INTRAMUSCULAR; INTRAVENOUS ONCE
Status: COMPLETED | OUTPATIENT
Start: 2020-01-17 | End: 2020-01-17

## 2020-01-17 RX ORDER — MORPHINE SULFATE 4 MG/ML
4 INJECTION, SOLUTION INTRAMUSCULAR; INTRAVENOUS ONCE
Status: COMPLETED | OUTPATIENT
Start: 2020-01-17 | End: 2020-01-17

## 2020-01-17 RX ORDER — ONDANSETRON 2 MG/ML
4 INJECTION INTRAMUSCULAR; INTRAVENOUS ONCE
Status: COMPLETED | OUTPATIENT
Start: 2020-01-17 | End: 2020-01-17

## 2020-01-17 RX ORDER — CIPROFLOXACIN 500 MG/1
500 TABLET, FILM COATED ORAL ONCE
Status: COMPLETED | OUTPATIENT
Start: 2020-01-17 | End: 2020-01-17

## 2020-01-17 RX ADMIN — ONDANSETRON 4 MG: 2 INJECTION INTRAMUSCULAR; INTRAVENOUS at 16:20

## 2020-01-17 RX ADMIN — SODIUM CHLORIDE 500 ML: 9 INJECTION, SOLUTION INTRAVENOUS at 17:16

## 2020-01-17 RX ADMIN — METRONIDAZOLE 500 MG: 500 SOLUTION INTRAVENOUS at 18:46

## 2020-01-17 RX ADMIN — MORPHINE SULFATE 4 MG: 4 INJECTION, SOLUTION INTRAMUSCULAR; INTRAVENOUS at 16:20

## 2020-01-17 RX ADMIN — SODIUM CHLORIDE: 9 INJECTION, SOLUTION INTRAVENOUS at 16:20

## 2020-01-17 RX ADMIN — POTASSIUM CHLORIDE 20 MEQ: 20 TABLET, EXTENDED RELEASE ORAL at 18:45

## 2020-01-17 RX ADMIN — IOPAMIDOL 100 ML: 755 INJECTION, SOLUTION INTRAVENOUS at 17:33

## 2020-01-17 RX ADMIN — HYDROMORPHONE HYDROCHLORIDE 0.5 MG: 1 INJECTION, SOLUTION INTRAMUSCULAR; INTRAVENOUS; SUBCUTANEOUS at 17:02

## 2020-01-17 RX ADMIN — Medication 3 ML: at 16:22

## 2020-01-17 RX ADMIN — CIPROFLOXACIN 500 MG: 500 TABLET, FILM COATED ORAL at 18:45

## 2020-01-17 RX ADMIN — DIPHENHYDRAMINE HYDROCHLORIDE 25 MG: 50 INJECTION INTRAMUSCULAR; INTRAVENOUS at 17:02

## 2020-01-17 ASSESSMENT — ENCOUNTER SYMPTOMS
DIARRHEA: 0
BLOOD IN STOOL: 0
STRIDOR: 0
CHEST TIGHTNESS: 0
SORE THROAT: 0
EYE PAIN: 0
SINUS PRESSURE: 0
SHORTNESS OF BREATH: 0
COUGH: 0
VOMITING: 0
WHEEZING: 0
FACIAL SWELLING: 0
EYE DISCHARGE: 0
VOICE CHANGE: 0
CONSTIPATION: 0
BACK PAIN: 0
EYE REDNESS: 0
ABDOMINAL PAIN: 1
CHOKING: 0
TROUBLE SWALLOWING: 0

## 2020-01-17 ASSESSMENT — PAIN DESCRIPTION - LOCATION: LOCATION: ABDOMEN

## 2020-01-17 ASSESSMENT — PAIN DESCRIPTION - FREQUENCY: FREQUENCY: CONTINUOUS

## 2020-01-17 ASSESSMENT — PAIN DESCRIPTION - PAIN TYPE: TYPE: ACUTE PAIN

## 2020-01-17 ASSESSMENT — PAIN DESCRIPTION - ORIENTATION: ORIENTATION: RIGHT;LOWER

## 2020-01-17 ASSESSMENT — PAIN DESCRIPTION - ONSET: ONSET: SUDDEN

## 2020-01-17 ASSESSMENT — PAIN SCALES - GENERAL: PAINLEVEL_OUTOF10: 7

## 2020-01-17 ASSESSMENT — PAIN DESCRIPTION - PROGRESSION: CLINICAL_PROGRESSION: GRADUALLY WORSENING

## 2020-01-17 NOTE — ED PROVIDER NOTES
[facility] ED  eMERGENCY dEPARTMENT eNCOUnter      Pt Name: Lexii Yee  MRN: [id number]  [place] 1945  Date of evaluation: 1/17/2020  Provider: Isacc Denis MD    CHIEF COMPLAINT       Chief Complaint   Patient presents with    Abdominal Pain     Lower right, about 30 minutes old       HISTORY OF PRESENT ILLNESS   (Location/Symptom, Timing/Onset,Context/Setting, Quality, Duration, Modifying Factors, Severity)  Note limiting factors. Lexii Yee is a 75 y.o. female who presents to the emergency department patient with history of hypertension GE reflux sleep apnea obesity and hyperlipidemia history diverticulosis comes to this emergency because of abdominal pain for the last half hour time no fever no chills no UTI symptoms no vomiting denies any diarrhea    HPI    NursingNotes were reviewed. REVIEW OF SYSTEMS    (2-9 systems for level 4, 10 or more for level 5)     Review of Systems   Constitutional: Negative. Negative for activity change and fever. HENT: Negative for congestion, drooling, facial swelling, mouth sores, nosebleeds, sinus pressure, sore throat, trouble swallowing and voice change. Eyes: Negative for pain, discharge, redness and visual disturbance. Respiratory: Negative for cough, choking, chest tightness, shortness of breath, wheezing and stridor. Cardiovascular: Negative for chest pain, palpitations and leg swelling. Gastrointestinal: Positive for abdominal pain. Negative for blood in stool, constipation, diarrhea and vomiting. Endocrine: Negative for cold intolerance, polyphagia and polyuria. Genitourinary: Negative for dysuria, flank pain, frequency, genital sores and urgency. Musculoskeletal: Negative for back pain, joint swelling, neck pain and neck stiffness. Skin: Negative for pallor and rash. Neurological: Negative for tremors, seizures, syncope, weakness, numbness and headaches. Hematological: Negative for adenopathy.  Does not All other components within normal limits   LIPASE   PROTIME-INR   URINE RT REFLEX TO CULTURE       All other labs were within normal range or not returned as of this dictation. EMERGENCY DEPARTMENT COURSE and DIFFERENTIAL DIAGNOSIS/MDM:   Vitals:    Vitals:    01/17/20 1603   BP: (!) 130/59   Pulse: 77   Resp: 30   Temp: 98.1 °F (36.7 °C)   SpO2: 98%   Weight: 163 lb (73.9 kg)   Height: 5' 3.5\" (1.613 m)           MDM  Number of Diagnoses or Management Options  Diagnosis management comments: Patient pain is started sometime today a few hours ago and patient is guarding tenderness initially pain was not relieved completely has to give another medication at this time pain is slightly better left is a satisfactory patient underwent a CAT scan shows patient has a sigmoid had patient has some perisigmoid stranding some fluid collection 2.1 x 2 cm no rupture-patient is allergic to penicillin start with Cipro and Flagyl at this time consultation initiated with the hospitalist at [facility]       Amount and/or Complexity of Data Reviewed  Clinical lab tests: ordered and reviewed        CRITICAL CARE TIME   Total Critical Care time was  minutes, excluding separately reportableprocedures. There was a high probability of clinicallysignificant/life threatening deterioration in the patient's condition which required my urgent intervention. ONSULTS:  None    PROCEDURES:  Unless otherwise noted below, none     Procedures    FINAL IMPRESSION      1. Sigmoid diverticulitis          DISPOSITION/PLAN   DISPOSITION        PATIENT REFERRED TO:  No follow-up provider specified.     DISCHARGE MEDICATIONS:  New Prescriptions    No medications on file          (Please note that portions of this note were completed with a voice recognition program.  Efforts were made to edit the dictations but occasionally words are mis-transcribed.)    Darryl Ca MD (electronically signed)  Attending Emergency Physician       Darryl Ca, MD  01/17/20 9516

## 2020-01-17 NOTE — ED NOTES
Called the [facility], spoke with Marily Hidalgo, to have the ED [facility] hospitalist paged for Dr. Marleni New.      Fernie Quesada  01/17/20 9729

## 2020-01-18 PROBLEM — E87.6 HYPOKALEMIA: Status: ACTIVE | Noted: 2020-01-18

## 2020-01-18 PROBLEM — N17.9 AKI (ACUTE KIDNEY INJURY) (HCC): Status: ACTIVE | Noted: 2020-01-18

## 2020-01-18 PROBLEM — K57.20 DIVERTICULITIS OF LARGE INTESTINE WITH ABSCESS WITHOUT BLEEDING: Status: ACTIVE | Noted: 2020-01-18

## 2020-01-18 LAB
ALBUMIN SERPL-MCNC: 3.3 G/DL (ref 3.5–4.6)
ALP BLD-CCNC: 45 U/L (ref 40–130)
ALT SERPL-CCNC: 7 U/L (ref 0–33)
ANION GAP SERPL CALCULATED.3IONS-SCNC: 13 MEQ/L (ref 9–15)
AST SERPL-CCNC: 13 U/L (ref 0–35)
BILIRUB SERPL-MCNC: 1 MG/DL (ref 0.2–0.7)
BUN BLDV-MCNC: 22 MG/DL (ref 8–23)
CALCIUM SERPL-MCNC: 8.8 MG/DL (ref 8.5–9.9)
CHLORIDE BLD-SCNC: 100 MEQ/L (ref 95–107)
CO2: 25 MEQ/L (ref 20–31)
CREAT SERPL-MCNC: 1.52 MG/DL (ref 0.5–0.9)
GFR AFRICAN AMERICAN: 40.3
GFR NON-AFRICAN AMERICAN: 33.3
GLOBULIN: 2.6 G/DL (ref 2.3–3.5)
GLUCOSE BLD-MCNC: 119 MG/DL (ref 60–115)
GLUCOSE BLD-MCNC: 136 MG/DL (ref 70–99)
HCT VFR BLD CALC: 35 % (ref 37–47)
HEMOGLOBIN: 12.2 G/DL (ref 12–16)
MAGNESIUM: 2 MG/DL (ref 1.7–2.4)
MCH RBC QN AUTO: 31.9 PG (ref 27–31.3)
MCHC RBC AUTO-ENTMCNC: 34.9 % (ref 33–37)
MCV RBC AUTO: 91.6 FL (ref 82–100)
PDW BLD-RTO: 14 % (ref 11.5–14.5)
PERFORMED ON: ABNORMAL
PLATELET # BLD: 208 K/UL (ref 130–400)
POTASSIUM REFLEX MAGNESIUM: 3.5 MEQ/L (ref 3.4–4.9)
RBC # BLD: 3.82 M/UL (ref 4.2–5.4)
SODIUM BLD-SCNC: 138 MEQ/L (ref 135–144)
TOTAL PROTEIN: 5.9 G/DL (ref 6.3–8)
WBC # BLD: 11 K/UL (ref 4.8–10.8)

## 2020-01-18 PROCEDURE — 6370000000 HC RX 637 (ALT 250 FOR IP): Performed by: HOSPITALIST

## 2020-01-18 PROCEDURE — 51798 US URINE CAPACITY MEASURE: CPT

## 2020-01-18 PROCEDURE — 2500000003 HC RX 250 WO HCPCS: Performed by: HOSPITALIST

## 2020-01-18 PROCEDURE — 83735 ASSAY OF MAGNESIUM: CPT

## 2020-01-18 PROCEDURE — 85027 COMPLETE CBC AUTOMATED: CPT

## 2020-01-18 PROCEDURE — 6360000002 HC RX W HCPCS: Performed by: HOSPITALIST

## 2020-01-18 PROCEDURE — 36415 COLL VENOUS BLD VENIPUNCTURE: CPT

## 2020-01-18 PROCEDURE — 1210000000 HC MED SURG R&B

## 2020-01-18 PROCEDURE — 99222 1ST HOSP IP/OBS MODERATE 55: CPT | Performed by: INTERNAL MEDICINE

## 2020-01-18 PROCEDURE — C9113 INJ PANTOPRAZOLE SODIUM, VIA: HCPCS | Performed by: HOSPITALIST

## 2020-01-18 PROCEDURE — 80053 COMPREHEN METABOLIC PANEL: CPT

## 2020-01-18 PROCEDURE — 2580000003 HC RX 258: Performed by: HOSPITALIST

## 2020-01-18 RX ORDER — PANTOPRAZOLE SODIUM 40 MG/1
40 TABLET, DELAYED RELEASE ORAL DAILY
COMMUNITY

## 2020-01-18 RX ORDER — LOSARTAN POTASSIUM AND HYDROCHLOROTHIAZIDE 12.5; 1 MG/1; MG/1
1 TABLET ORAL DAILY
COMMUNITY

## 2020-01-18 RX ORDER — BUPROPION HYDROCHLORIDE 300 MG/1
300 TABLET ORAL EVERY MORNING
Status: DISCONTINUED | OUTPATIENT
Start: 2020-01-18 | End: 2020-01-20 | Stop reason: HOSPADM

## 2020-01-18 RX ORDER — MORPHINE SULFATE 4 MG/ML
4 INJECTION, SOLUTION INTRAMUSCULAR; INTRAVENOUS
Status: DISCONTINUED | OUTPATIENT
Start: 2020-01-18 | End: 2020-01-20 | Stop reason: HOSPADM

## 2020-01-18 RX ORDER — SODIUM CHLORIDE 0.9 % (FLUSH) 0.9 %
10 SYRINGE (ML) INJECTION EVERY 12 HOURS SCHEDULED
Status: DISCONTINUED | OUTPATIENT
Start: 2020-01-18 | End: 2020-01-20 | Stop reason: HOSPADM

## 2020-01-18 RX ORDER — MORPHINE SULFATE 2 MG/ML
2 INJECTION, SOLUTION INTRAMUSCULAR; INTRAVENOUS
Status: DISCONTINUED | OUTPATIENT
Start: 2020-01-18 | End: 2020-01-20 | Stop reason: HOSPADM

## 2020-01-18 RX ORDER — DONEPEZIL HYDROCHLORIDE 10 MG/1
5 TABLET, FILM COATED ORAL NIGHTLY
COMMUNITY

## 2020-01-18 RX ORDER — FERROUS SULFATE 325(65) MG
325 TABLET ORAL
COMMUNITY

## 2020-01-18 RX ORDER — LOSARTAN POTASSIUM 50 MG/1
50 TABLET ORAL DAILY
Status: DISCONTINUED | OUTPATIENT
Start: 2020-01-18 | End: 2020-01-20 | Stop reason: HOSPADM

## 2020-01-18 RX ORDER — SODIUM CHLORIDE 9 MG/ML
10 INJECTION INTRAVENOUS DAILY
Status: DISCONTINUED | OUTPATIENT
Start: 2020-01-18 | End: 2020-01-20 | Stop reason: HOSPADM

## 2020-01-18 RX ORDER — TRAZODONE HYDROCHLORIDE 50 MG/1
50 TABLET ORAL NIGHTLY
COMMUNITY

## 2020-01-18 RX ORDER — POTASSIUM CHLORIDE 750 MG/1
10 TABLET, FILM COATED, EXTENDED RELEASE ORAL DAILY
Status: DISCONTINUED | OUTPATIENT
Start: 2020-01-18 | End: 2020-01-20 | Stop reason: HOSPADM

## 2020-01-18 RX ORDER — SODIUM CHLORIDE, SODIUM LACTATE, POTASSIUM CHLORIDE, CALCIUM CHLORIDE 600; 310; 30; 20 MG/100ML; MG/100ML; MG/100ML; MG/100ML
INJECTION, SOLUTION INTRAVENOUS CONTINUOUS
Status: DISCONTINUED | OUTPATIENT
Start: 2020-01-18 | End: 2020-01-20 | Stop reason: HOSPADM

## 2020-01-18 RX ORDER — BUSPIRONE HYDROCHLORIDE 7.5 MG/1
15 TABLET ORAL 2 TIMES DAILY
Status: DISCONTINUED | OUTPATIENT
Start: 2020-01-18 | End: 2020-01-20 | Stop reason: HOSPADM

## 2020-01-18 RX ORDER — SIMVASTATIN 20 MG
20 TABLET ORAL EVERY EVENING
Status: DISCONTINUED | OUTPATIENT
Start: 2020-01-18 | End: 2020-01-20 | Stop reason: HOSPADM

## 2020-01-18 RX ORDER — CIPROFLOXACIN 2 MG/ML
400 INJECTION, SOLUTION INTRAVENOUS EVERY 12 HOURS
Status: DISCONTINUED | OUTPATIENT
Start: 2020-01-18 | End: 2020-01-20 | Stop reason: HOSPADM

## 2020-01-18 RX ORDER — SODIUM CHLORIDE 0.9 % (FLUSH) 0.9 %
10 SYRINGE (ML) INJECTION PRN
Status: DISCONTINUED | OUTPATIENT
Start: 2020-01-18 | End: 2020-01-20 | Stop reason: HOSPADM

## 2020-01-18 RX ORDER — ACETAMINOPHEN 650 MG/1
650 SUPPOSITORY RECTAL EVERY 4 HOURS PRN
Status: DISCONTINUED | OUTPATIENT
Start: 2020-01-18 | End: 2020-01-20 | Stop reason: HOSPADM

## 2020-01-18 RX ORDER — ROPINIROLE 0.5 MG/1
0.5 TABLET, FILM COATED ORAL DAILY
COMMUNITY

## 2020-01-18 RX ORDER — PANTOPRAZOLE SODIUM 40 MG/10ML
40 INJECTION, POWDER, LYOPHILIZED, FOR SOLUTION INTRAVENOUS DAILY
Status: DISCONTINUED | OUTPATIENT
Start: 2020-01-18 | End: 2020-01-20 | Stop reason: HOSPADM

## 2020-01-18 RX ORDER — MAGNESIUM GLUCONATE 27 MG(500)
250 TABLET ORAL DAILY
COMMUNITY

## 2020-01-18 RX ORDER — ONDANSETRON 2 MG/ML
4 INJECTION INTRAMUSCULAR; INTRAVENOUS EVERY 6 HOURS PRN
Status: DISCONTINUED | OUTPATIENT
Start: 2020-01-18 | End: 2020-01-20 | Stop reason: HOSPADM

## 2020-01-18 RX ORDER — ESCITALOPRAM OXALATE 10 MG/1
10 TABLET ORAL DAILY
COMMUNITY

## 2020-01-18 RX ADMIN — MORPHINE SULFATE 2 MG: 2 INJECTION, SOLUTION INTRAMUSCULAR; INTRAVENOUS at 18:34

## 2020-01-18 RX ADMIN — ENOXAPARIN SODIUM 40 MG: 40 INJECTION SUBCUTANEOUS at 09:23

## 2020-01-18 RX ADMIN — METRONIDAZOLE 500 MG: 500 INJECTION, SOLUTION INTRAVENOUS at 03:08

## 2020-01-18 RX ADMIN — Medication 10 ML: at 09:25

## 2020-01-18 RX ADMIN — CIPROFLOXACIN 400 MG: 2 INJECTION, SOLUTION INTRAVENOUS at 19:27

## 2020-01-18 RX ADMIN — SODIUM CHLORIDE, POTASSIUM CHLORIDE, SODIUM LACTATE AND CALCIUM CHLORIDE: 600; 310; 30; 20 INJECTION, SOLUTION INTRAVENOUS at 01:37

## 2020-01-18 RX ADMIN — MORPHINE SULFATE 2 MG: 2 INJECTION, SOLUTION INTRAMUSCULAR; INTRAVENOUS at 15:58

## 2020-01-18 RX ADMIN — SODIUM CHLORIDE, POTASSIUM CHLORIDE, SODIUM LACTATE AND CALCIUM CHLORIDE: 600; 310; 30; 20 INJECTION, SOLUTION INTRAVENOUS at 14:35

## 2020-01-18 RX ADMIN — PANTOPRAZOLE SODIUM 40 MG: 40 INJECTION, POWDER, FOR SOLUTION INTRAVENOUS at 09:24

## 2020-01-18 RX ADMIN — ONDANSETRON 4 MG: 2 INJECTION INTRAMUSCULAR; INTRAVENOUS at 15:59

## 2020-01-18 RX ADMIN — MAGNESIUM HYDROXIDE 30 ML: 400 SUSPENSION ORAL at 15:58

## 2020-01-18 RX ADMIN — METRONIDAZOLE 500 MG: 500 INJECTION, SOLUTION INTRAVENOUS at 18:08

## 2020-01-18 RX ADMIN — METRONIDAZOLE 500 MG: 500 INJECTION, SOLUTION INTRAVENOUS at 09:23

## 2020-01-18 RX ADMIN — CIPROFLOXACIN 400 MG: 2 INJECTION, SOLUTION INTRAVENOUS at 05:42

## 2020-01-18 ASSESSMENT — PAIN SCALES - GENERAL
PAINLEVEL_OUTOF10: 5

## 2020-01-18 NOTE — H&P
[facility]  MEDICINE    HISTORY AND PHYSICAL EXAM    PATIENT NAME:  Malathi Mendenhall    MRN:  [id number]  SERVICE DATE:  1/18/2020   SERVICE TIME:  3:42 AM    Primary Care Physician: Danuta Hein MD         SUBJECTIVE  CHIEF COMPLAINT: Left lower quadrant abdominal pain    HPI:  This is a 75 y.o. female who presents with significant PMH diverticulosis, HTN, GERD, HLD; patient presented to the ER yesterday evening with complaints of left lower quadrant pain that started in the morning shortly after she woke up. There is no associated nausea or emesis and, pain does not radiate. It is contained to the right lower quadrant described as ache and sharp, patient was rating her pain about 7 out of 10 but currently only describes her pain is 3 out of 10. Worse with touch. Patient denies any history of diverticulitis or bowel perforation. Alleviating factors include medication given in the ER. Patient denies hematemesis, hematochezia, melena. Patient has not eaten since yesterday morning as it hurts week. She does not have an appetite since the pain started. Patient denies fevers, chills, cough, congestion, myalgias, headache, dizziness lightheadedness, chest pain, shortness of breath, nausea, vomiting, diarrhea, dysuria, numbness or tingling, tremors, falls, rash.     PAST MEDICAL HISTORY:    Past Medical History:   Diagnosis Date    Chronic cough     Depression     Diverticulosis     Essential hypertension     somewhat labile, but overall stable    Hyperlipidemia     Type2A    Low bone mass     spine    Menopause     Reflux     chronic with periodic dysphagia    Sedentary lifestyle     Sleep apnea      PAST SURGICAL HISTORY:    Past Surgical History:   Procedure Laterality Date    CERVICAL DISC SURGERY      ESOPHAGEAL DILATATION  [id number]    LUMBAR DISC SURGERY  [id number]    TONSILLECTOMY       FAMILY HISTORY:    Family History   Problem Relation Age of Onset    Arthritis Mother     Cancer Historical Provider, MD   amitriptyline (ELAVIL) 10 MG tablet Take 1 tablet by mouth nightly. 1/24/13 1/17/20 Yes Denise León DO   hydrochlorothiazide (HYDRODIURIL) 25 MG tablet Take 1 tablet by mouth daily. 1/24/13 1/17/20 Yes Denise León DO   potassium chloride (POTASSIUM CHLORIDE) 10 MEQ tablet Take 1 tablet by mouth daily. 1/24/13  Yes Denise León DO   LORazepam (ATIVAN) 0.5 MG tablet Take 1 tablet by mouth daily as needed for Anxiety. 1/24/13  Yes Denise León DO   buPROPion (WELLBUTRIN XL) 300 MG XL tablet Take 1 tablet by mouth every morning. 1/24/13  Yes Denise León DO   busPIRone (BUSPAR) 15 MG tablet Take 1 tablet by mouth 2 times daily. 12/20/12  Yes Denise León DO   traMADol (ULTRAM) 50 MG tablet Take 1 tablet by mouth every 8 hours as needed for Pain. 12/11/12  Yes KATH Jovel   vitamin D (ERGOCALCIFEROL) 23337 UNITS CAPS capsule Take 1 capsule by mouth every 14 days. 10/22/12  Yes Denise León DO   simvastatin (ZOCOR) 20 MG tablet Take 1 tablet by mouth every evening. 7/5/12 1/17/20 Yes Berta Soriano MD   zolpidem (AMBIEN) 5 MG tablet One hs prn insomnia. 5/24/12  Yes Berta Soriano MD   Calcium Carbonate-Vitamin D (CALCIUM 600+D) 600-200 MG-UNIT TABS Take  by mouth 2 times daily. Yes Historical Provider, MD   omeprazole (PRILOSEC) 40 MG capsule Take 40 mg by mouth daily. Yes Historical Provider, MD       ALLERGIES: Betadine [povidone iodine]; Celebrex [celecoxib]; Contrast [iodides]; Mercury; Other; Pcn [penicillins]; and Zinc    REVIEW OF SYSTEM:   ROS as noted in HPI, 12 point ROS reviewed and otherwise negative. OBJECTIVE  PHYSICAL EXAM: There were no vitals taken for this visit.     CONSTITUTIONAL:  awake, alert and cooperative  HEMATOLOGIC/LYMPHATICS:  no cervical lymphadenopathy and no supraclavicular lymphadenopathy  LUNGS:  No increased work of breathing, good air exchange, clear to auscultation bilaterally, no crackles or wheezing  CARDIOVASCULAR:  normal apical pulses, regular rate and rhythm, normal S1 and S2 and no edema  ABDOMEN:  hypoactive bowel sounds, soft, non-distended and tenderness noted in the left lower quadrant, no guarding or rebound  MUSCULOSKELETAL:  there is no redness, warmth, or swelling of the joints  tone is normal  NEUROLOGIC:  Awake, alert, oriented to name, place and time. Cranial nerves II-XII are grossly intact. SKIN:  no rashes and no lesions    DATA:     Diagnostic tests reviewed for today's visit:    Most recent labs and imaging results reviewed.      LABS:    Recent Results (from the past 24 hour(s))   Comprehensive Metabolic Panel    Collection Time: 01/17/20  4:23 PM   Result Value Ref Range    Sodium 138 135 - 144 mEq/L    Potassium 3.3 (L) 3.4 - 4.9 mEq/L    Chloride 93 (L) 95 - 107 mEq/L    CO2 30 20 - 31 mEq/L    Anion Gap 15 9 - 15 mEq/L    Glucose 145 (H) 70 - 99 mg/dL    BUN 24 (H) 8 - 23 mg/dL    CREATININE 1.53 (H) 0.50 - 0.90 mg/dL    GFR Non-African American 33.0 (L) >60    GFR  40.0 (L) >60    Calcium 10.2 (H) 8.5 - 9.9 mg/dL    Total Protein 7.0 6.3 - 8.0 g/dL    Alb 4.2 3.5 - 4.6 g/dL    Total Bilirubin 0.9 (H) 0.2 - 0.7 mg/dL    Alkaline Phosphatase 62 40 - 130 U/L    ALT 10 0 - 33 U/L    AST 18 0 - 35 U/L    Globulin 2.8 2.3 - 3.5 g/dL   CBC Auto Differential    Collection Time: 01/17/20  4:23 PM   Result Value Ref Range    WBC 10.6 4.8 - 10.8 K/uL    RBC 4.38 4.20 - 5.40 M/uL    Hemoglobin 13.4 12.0 - 16.0 g/dL    Hematocrit 40.6 37.0 - 47.0 %    MCV 92.8 82.0 - 100.0 fL    MCH 30.5 27.0 - 31.3 pg    MCHC 32.9 (L) 33.0 - 37.0 %    RDW 13.9 11.5 - 14.5 %    Platelets 454 118 - 412 K/uL    Neutrophils % 90.2 %    Lymphocytes % 6.3 %    Monocytes % 2.1 %    Eosinophils % 0.2 %    Basophils % 1.2 %    Neutrophils Absolute 9.6 (H) 1.4 - 6.5 K/uL    Lymphocytes Absolute 0.7 (L) 1.0 - 4.8 K/uL    Monocytes Absolute 0.2 0.2 - 0.8 K/uL    Eosinophils Absolute 0.0 0.0 - 0.7 duct dilation. Possible stone or tortuous fold near the gallbladder neck. No pericholecystic fluid or gallbladder wall thickening. Pancreas: No mass or duct dilation. Spleen: No mass. No splenomegaly. Adrenals: No mass. Kidneys: Mild bilateral renal atrophy. No hydronephrosis or nephrolithiasis. Multiple bilateral low-attenuation benign cysts and other subcentimeter lesions too small to characterize but likely benign. Normal uptake and excretion of contrast into the renal collecting systems. GI tract: Diverticulosis, especially involving the sigmoid region. Short segment of perisigmoid wall thickening and adjacent stranding. Lymph nodes: No abdominal or pelvic lymphadenopathy. Mesentery/Peritoneum: Perisigmoid stranding. 2.1 x 2.0 x 2.3 cm perisigmoid fluid collection. Shaina Campo No pneumoperitoneum. Retroperitoneum: No mass. Vasculature: The celiac axis and SMA are patent. The portal vein and branches, splenic vein, SMV, and hepatic veins are patent. Moderate arterial atherosclerotic disease without aneurysm. Pelvis: IUD within the uterus. Bladder decompressed. Bones: Age-indeterminate compression deformity of the L4 vertebral body with approximately 40% height loss, new from CT 8/4/2015. Compression deformity of the L1 vertebral body with vertebral body augmentation. Few bone islands in the left hemipelvis. Degenerative changes. Soft tissues: Unremarkable. Lower thorax: Unremarkable. Acute sigmoid diverticulitis, with small associated abscess.  Age-indeterminate compression deformity of L4, with 40% height loss, new from CT 8/4/2015. ==========       VTE Prophylaxis: low molecular weight heparin -  start    ASSESSMENT AND PLAN  Principal Problem:    Diverticulitis of large intestine with abscess without bleeding  Assessment: CT abdomen and pelvis from [facility] ER shows acute sigmoid diverticulitis with small associated abscess, no leukocytosis, afebrile  Plan:   Admit  Consult general surgery  N.p.o. except for

## 2020-01-18 NOTE — CONSULTS
 Highest education level: Not on file   Occupational History    Not on file   Social Needs    Financial resource strain: Not on file    Food insecurity:     Worry: Not on file     Inability: Not on file    Transportation needs:     Medical: Not on file     Non-medical: Not on file   Tobacco Use    Smoking status: Former Smoker     Last attempt to quit: 1997     Years since quittin.0    Smokeless tobacco: Never Used   Substance and Sexual Activity    Alcohol use: Yes     Alcohol/week: 2.0 standard drinks     Types: 2 Glasses of wine per week     Comment: Daily    Drug use: No    Sexual activity: Not on file   Lifestyle    Physical activity:     Days per week: Not on file     Minutes per session: Not on file    Stress: Not on file   Relationships    Social connections:     Talks on phone: Not on file     Gets together: Not on file     Attends Druze service: Not on file     Active member of club or organization: Not on file     Attends meetings of clubs or organizations: Not on file     Relationship status: Not on file    Intimate partner violence:     Fear of current or ex partner: Not on file     Emotionally abused: Not on file     Physically abused: Not on file     Forced sexual activity: Not on file   Other Topics Concern    Not on file   Social History Narrative    Not on file      Home Medications:      Medications Prior to Admission: CPAP Machine MISC, by CPAP route Per  setting is at 15 and patient needs a full mask  losartan (COZAAR) 50 MG tablet, daily   Melatonin 10 MG CAPS, Take by mouth  amitriptyline (ELAVIL) 10 MG tablet, Take 1 tablet by mouth nightly. hydrochlorothiazide (HYDRODIURIL) 25 MG tablet, Take 1 tablet by mouth daily. potassium chloride (POTASSIUM CHLORIDE) 10 MEQ tablet, Take 1 tablet by mouth daily. buPROPion (WELLBUTRIN XL) 300 MG XL tablet, Take 1 tablet by mouth every morning.   busPIRone (BUSPAR) 15 MG tablet, Take 1 tablet by mouth 2 times daily.  vitamin D (ERGOCALCIFEROL) 03472 UNITS CAPS capsule, Take 1 capsule by mouth every 14 days. simvastatin (ZOCOR) 20 MG tablet, Take 1 tablet by mouth every evening. Calcium Carbonate-Vitamin D (CALCIUM 600+D) 600-200 MG-UNIT TABS, Take  by mouth 2 times daily. omeprazole (PRILOSEC) 40 MG capsule, Take 40 mg by mouth daily. [DISCONTINUED] Clopidogrel Bisulfate (PLAVIX PO), Take 50 mg by mouth   [DISCONTINUED] loperamide (IMODIUM A-D) 2 MG tablet, Take 2 mg by mouth  [DISCONTINUED] traZODone (DESYREL) 50 MG tablet,   [DISCONTINUED] simvastatin (ZOCOR) 20 MG tablet,   [DISCONTINUED] CREON 67218 UNITS CPEP,   [DISCONTINUED] CARAFATE 1 GM/10ML suspension,   [DISCONTINUED] predniSONE (DELTASONE) 10 MG tablet, 1 tab TID x 3 days, 1 tab BID x 3 days, 1 tab QD x 3 days  [DISCONTINUED] buPROPion (WELLBUTRIN XL) 150 MG XL tablet, Take 1 tablet by mouth every morning for 7 days. [DISCONTINUED] ALPRAZolam (XANAX) 0.5 MG tablet, Take 0.5 tablets by mouth 2 times daily as needed for Anxiety. [DISCONTINUED] gabapentin (NEURONTIN) 400 MG capsule, Take 400 mg by mouth See Admin Instructions.  2 qhs     Current Hospital Medications:   Scheduled Meds:   sodium chloride flush  10 mL Intravenous 2 times per day    enoxaparin  40 mg Subcutaneous Daily    ciprofloxacin  400 mg Intravenous Q12H    metroNIDAZOLE  500 mg Intravenous Q8H    pantoprazole  40 mg Intravenous Daily    And    sodium chloride (PF)  10 mL Intravenous Daily    [Held by provider] buPROPion  300 mg Oral QAM    [Held by provider] busPIRone  15 mg Oral BID    [Held by provider] losartan  50 mg Oral Daily    [Held by provider] potassium chloride  10 mEq Oral Daily    [Held by provider] simvastatin  20 mg Oral QPM     Continuous Infusions:   lactated ringers 100 mL/hr at 01/18/20 0137     PRN Meds:.sodium chloride flush, magnesium hydroxide, ondansetron, acetaminophen, morphine **OR** morphine   lactated ringers 100 mL/hr at 01/18/20 0137 91.6    208     Recent Labs     01/17/20  1623 01/18/20  0612    138   K 3.3* 3.5   CL 93* 100   CO2 30 25   BUN 24* 22   CREATININE 1.53* 1.52*     Recent Labs     01/17/20  1623 01/18/20  0612   AST 18 13   ALT 10 7   BILITOT 0.9* 1.0*   ALKPHOS 62 45     Recent Labs     01/17/20  1623   LIPASE 43   AMYLASE 101*     Recent Labs     01/17/20  1623 01/18/20  0612   PROT 7.0 5.9*   INR 1.0  --      Ct Abdomen Pelvis W Iv Contrast Additional Contrast? None  Result Date: 1/17/2020  Acute sigmoid diverticulitis, with small associated abscess. Age-indeterminate compression deformity of L4, with 40% height loss    Endoscopic Investigations: Colonoscopy 2015- diffuse mild inflammation in the sigmoid colon and descending colon, secondary to colitis. Biopsied. Diverticulosis in the sigmoid. +lymphocytic colitis. EGD 2015-  revealed evidence of Schatzki's ring that was dilated and small bowel biopsies were negative for celiac disease. Gastric biopsies were negative for H. pylori infection. Impression:   75year old female patient admitted with diverticulitis, with a small associated abscess. She is afebrile, slight leukocytosis (11.0). Being treated with IV metronidazole and ciprofloxacin. Surgery has been consulted for findings of associated small abscess. Patient's  with concern for recent decreased appetite and weight loss. Her last colonoscopy was in 2015, with findings of lymphocytic colitis and diverticulosis. EGD findings of Schatzki's ring. Acute diverticulitis with small abscess, likely to respond to IV antibiotics, low likelihood of requiring surgery  Patient can have complete GI work-up for decreased appetite and weight loss on an outpatient basis.    Plan:   - Continue course of care  - Surgical consult note awaited  - Monitor CBC  - Ok to advance diet, when ok with Surgeon  - May be discharged home on oral antibiotics if improvement in symptoms, white cell count, temperature and abdominal pain in the next 24 hours  Comments: Thank you for allowing us to participate in the care of this patient. Will continue to follow. Please call if questions or concerns arise. Electronically signed by Gertrude Cavanaugh MD on 1/18/2020 at 12:06 PM    Please note this report has been partially produced using speech recognition software and may cause contain errors related to that system including grammar, punctuation and spelling as well as words and phrases that may seem inappropriate. If there are questions or concerns please feel free to contact me to clarify.

## 2020-01-18 NOTE — PROGRESS NOTES
Patient admitted after midnight, seen and examined, on iv abx, small diverticular abscess, awaiting gen surgery eval.  NPO.

## 2020-01-18 NOTE — PROGRESS NOTES
Patient getting out of bed every 15 minutes to go pee. Patient has very little output when she goes. Patient states this is not normal for her. Bladder scanned patient and 0ML showed. Let NP know, no new orders.

## 2020-01-18 NOTE — ED NOTES
Called [facility] to get an updated ETA:  30-45 minutes.      Sergio Tellez  01/17/20 2014

## 2020-01-18 NOTE — CARE COORDINATION
[facility] AT [place] Case Management Initial Discharge Assessment    Met with Patient to discuss discharge plan. PCP: Geronimo Sacks, MD                                Date of Last Visit: \"Saw NP last week\"    If no PCP, list provided? N/A    Discharge Planning    Living Arrangements: independently at home - [address]    Who do you live with?     Who helps you with your care:  self or family    If lives at home:     Do you have any barriers navigating in your home? no    Patient can perform ADL? Yes    Current Services (outpatient and in home) :  None    Dialysis: No    Is transportation available to get to your appointments? Yes -  drives    DME Equipment:  yes - cane    Respiratory equipment: CPAP without O2 - has here @ hospital    Respiratory provider:  yes -      Pharmacy:  yes - Drug [facility] with Medication Assistance Program?  No      [facility] Declined    Patient agreeable to SNF/Rehab? Declined    Other discharge needs identified? N/A    Freedom of choice list provided with basic dialogue that supports the patient's individualized plan of care/goals and shares the quality data associated with the providers. N/A    Does Patient Have a High-Risk for Readmission Diagnosis (CHF, PN, MI, COPD)? No    If Yes,Initial Discharge Plan? (Note: please see concurrent daily documentation for any updates after initial note).     Patient denies needs @ discharge    The Patient and/or patient representative: Patient was provided with choice of any post-acute providers for care and equipment and agrees with discharge plan  N/A    Electronically signed by Dmitry Amin RN on 1/18/2020 at 1:03 PM

## 2020-01-19 LAB
ALBUMIN SERPL-MCNC: 2.8 G/DL (ref 3.5–4.6)
ALP BLD-CCNC: 42 U/L (ref 40–130)
ALT SERPL-CCNC: 7 U/L (ref 0–33)
ANION GAP SERPL CALCULATED.3IONS-SCNC: 8 MEQ/L (ref 9–15)
AST SERPL-CCNC: 14 U/L (ref 0–35)
BILIRUB SERPL-MCNC: 1 MG/DL (ref 0.2–0.7)
BUN BLDV-MCNC: 18 MG/DL (ref 8–23)
CALCIUM SERPL-MCNC: 8.6 MG/DL (ref 8.5–9.9)
CHLORIDE BLD-SCNC: 100 MEQ/L (ref 95–107)
CO2: 28 MEQ/L (ref 20–31)
CREAT SERPL-MCNC: 1.27 MG/DL (ref 0.5–0.9)
GFR AFRICAN AMERICAN: 49.5
GFR NON-AFRICAN AMERICAN: 40.9
GLOBULIN: 2.6 G/DL (ref 2.3–3.5)
GLUCOSE BLD-MCNC: 101 MG/DL (ref 60–115)
GLUCOSE BLD-MCNC: 104 MG/DL (ref 70–99)
GLUCOSE BLD-MCNC: 114 MG/DL (ref 60–115)
GLUCOSE BLD-MCNC: 126 MG/DL (ref 60–115)
GLUCOSE BLD-MCNC: 91 MG/DL (ref 60–115)
HCT VFR BLD CALC: 29.6 % (ref 37–47)
HEMOGLOBIN: 10.3 G/DL (ref 12–16)
MAGNESIUM: 1.8 MG/DL (ref 1.7–2.4)
MCH RBC QN AUTO: 31.5 PG (ref 27–31.3)
MCHC RBC AUTO-ENTMCNC: 34.6 % (ref 33–37)
MCV RBC AUTO: 90.9 FL (ref 82–100)
PDW BLD-RTO: 13.9 % (ref 11.5–14.5)
PERFORMED ON: ABNORMAL
PERFORMED ON: NORMAL
PLATELET # BLD: 170 K/UL (ref 130–400)
POTASSIUM REFLEX MAGNESIUM: 3.3 MEQ/L (ref 3.4–4.9)
RBC # BLD: 3.26 M/UL (ref 4.2–5.4)
SODIUM BLD-SCNC: 136 MEQ/L (ref 135–144)
TOTAL PROTEIN: 5.4 G/DL (ref 6.3–8)
URINE CULTURE, ROUTINE: NORMAL
WBC # BLD: 8.9 K/UL (ref 4.8–10.8)

## 2020-01-19 PROCEDURE — 99221 1ST HOSP IP/OBS SF/LOW 40: CPT | Performed by: COLON & RECTAL SURGERY

## 2020-01-19 PROCEDURE — 2500000003 HC RX 250 WO HCPCS: Performed by: HOSPITALIST

## 2020-01-19 PROCEDURE — 83735 ASSAY OF MAGNESIUM: CPT

## 2020-01-19 PROCEDURE — 2580000003 HC RX 258: Performed by: HOSPITALIST

## 2020-01-19 PROCEDURE — 6370000000 HC RX 637 (ALT 250 FOR IP): Performed by: FAMILY MEDICINE

## 2020-01-19 PROCEDURE — 2700000000 HC OXYGEN THERAPY PER DAY

## 2020-01-19 PROCEDURE — 36415 COLL VENOUS BLD VENIPUNCTURE: CPT

## 2020-01-19 PROCEDURE — 80053 COMPREHEN METABOLIC PANEL: CPT

## 2020-01-19 PROCEDURE — 1210000000 HC MED SURG R&B

## 2020-01-19 PROCEDURE — 6360000002 HC RX W HCPCS: Performed by: HOSPITALIST

## 2020-01-19 PROCEDURE — 85027 COMPLETE CBC AUTOMATED: CPT

## 2020-01-19 PROCEDURE — C9113 INJ PANTOPRAZOLE SODIUM, VIA: HCPCS | Performed by: HOSPITALIST

## 2020-01-19 RX ORDER — DONEPEZIL HYDROCHLORIDE 5 MG/1
5 TABLET, FILM COATED ORAL NIGHTLY
Status: DISCONTINUED | OUTPATIENT
Start: 2020-01-19 | End: 2020-01-20 | Stop reason: HOSPADM

## 2020-01-19 RX ORDER — LOPERAMIDE HYDROCHLORIDE 2 MG/1
2 CAPSULE ORAL ONCE
Status: COMPLETED | OUTPATIENT
Start: 2020-01-20 | End: 2020-01-20

## 2020-01-19 RX ORDER — ROPINIROLE 0.5 MG/1
0.5 TABLET, FILM COATED ORAL DAILY
Status: DISCONTINUED | OUTPATIENT
Start: 2020-01-19 | End: 2020-01-20 | Stop reason: HOSPADM

## 2020-01-19 RX ORDER — FERROUS SULFATE 325(65) MG
325 TABLET ORAL
Status: DISCONTINUED | OUTPATIENT
Start: 2020-01-20 | End: 2020-01-20 | Stop reason: HOSPADM

## 2020-01-19 RX ORDER — TRAZODONE HYDROCHLORIDE 50 MG/1
50 TABLET ORAL NIGHTLY
Status: DISCONTINUED | OUTPATIENT
Start: 2020-01-19 | End: 2020-01-20 | Stop reason: HOSPADM

## 2020-01-19 RX ADMIN — Medication 10 ML: at 20:55

## 2020-01-19 RX ADMIN — DONEPEZIL HYDROCHLORIDE 5 MG: 5 TABLET, FILM COATED ORAL at 20:55

## 2020-01-19 RX ADMIN — Medication 10 ML: at 10:31

## 2020-01-19 RX ADMIN — Medication 4 MG: at 04:35

## 2020-01-19 RX ADMIN — METRONIDAZOLE 500 MG: 500 INJECTION, SOLUTION INTRAVENOUS at 00:58

## 2020-01-19 RX ADMIN — SODIUM CHLORIDE, POTASSIUM CHLORIDE, SODIUM LACTATE AND CALCIUM CHLORIDE: 600; 310; 30; 20 INJECTION, SOLUTION INTRAVENOUS at 20:55

## 2020-01-19 RX ADMIN — SODIUM CHLORIDE, POTASSIUM CHLORIDE, SODIUM LACTATE AND CALCIUM CHLORIDE: 600; 310; 30; 20 INJECTION, SOLUTION INTRAVENOUS at 10:31

## 2020-01-19 RX ADMIN — Medication 10 ML: at 10:25

## 2020-01-19 RX ADMIN — METRONIDAZOLE 500 MG: 500 INJECTION, SOLUTION INTRAVENOUS at 10:31

## 2020-01-19 RX ADMIN — TRAZODONE HYDROCHLORIDE 50 MG: 50 TABLET ORAL at 20:55

## 2020-01-19 RX ADMIN — METRONIDAZOLE 500 MG: 500 INJECTION, SOLUTION INTRAVENOUS at 17:11

## 2020-01-19 RX ADMIN — CIPROFLOXACIN 400 MG: 2 INJECTION, SOLUTION INTRAVENOUS at 06:02

## 2020-01-19 RX ADMIN — SODIUM CHLORIDE, POTASSIUM CHLORIDE, SODIUM LACTATE AND CALCIUM CHLORIDE: 600; 310; 30; 20 INJECTION, SOLUTION INTRAVENOUS at 00:04

## 2020-01-19 RX ADMIN — CIPROFLOXACIN 400 MG: 2 INJECTION, SOLUTION INTRAVENOUS at 18:03

## 2020-01-19 RX ADMIN — ENOXAPARIN SODIUM 40 MG: 40 INJECTION SUBCUTANEOUS at 10:25

## 2020-01-19 RX ADMIN — PANTOPRAZOLE SODIUM 40 MG: 40 INJECTION, POWDER, FOR SOLUTION INTRAVENOUS at 10:25

## 2020-01-19 ASSESSMENT — PAIN SCALES - GENERAL: PAINLEVEL_OUTOF10: 7

## 2020-01-19 NOTE — PROGRESS NOTES
Oral Daily    [Held by provider] simvastatin  20 mg Oral QPM     PRN Meds: sodium chloride flush, magnesium hydroxide, ondansetron, acetaminophen, morphine **OR** morphine    Labs:   Recent Labs     01/17/20  1623 01/18/20  0612 01/19/20  0605   WBC 10.6 11.0* 8.9   HGB 13.4 12.2 10.3*   HCT 40.6 35.0* 29.6*    208 170     Recent Labs     01/17/20  1623 01/18/20  0612 01/19/20  0605    138 136   K 3.3* 3.5 3.3*   CL 93* 100 100   CO2 30 25 28   BUN 24* 22 18   CREATININE 1.53* 1.52* 1.27*   CALCIUM 10.2* 8.8 8.6     Recent Labs     01/17/20  1623 01/18/20  0612 01/19/20  0605   AST 18 13 14   ALT 10 7 7   BILITOT 0.9* 1.0* 1.0*   ALKPHOS 62 45 42     Recent Labs     01/17/20  1623   INR 1.0     No results for input(s): CKTOTAL, TROPONINI in the last 72 hours. Urinalysis:   Lab Results   Component Value Date    NITRU Negative 01/17/2020    WBCUA 0-2 01/17/2020    BACTERIA None 01/17/2020    RBCUA 5-10 01/17/2020    BLOODU Moderate 01/17/2020    SPECGRAV 1.010 01/17/2020    GLUCOSEU Negative 01/17/2020    GLUCOSEU NEG 03/08/2012       Radiology:   Most recent    Chest CT      WITH CONTRAST:No results found for this or any previous visit. WITHOUT CONTRAST: No results found for this or any previous visit. CXR      2-view: No results found for this or any previous visit. Portable:   Results for orders placed during the hospital encounter of 07/08/17   XR Chest Portable    Narrative EXAMINATION: Portable AP ERECT view of the chest.    CLINICAL HISTORY: Chest pain. .    COMPARISONS: 3/31/2014    FINDINGS: Normal cardiac silhouette. There are atherosclerotic calcifications in the aortic arch. Lungs are clear without consolidation. No pleural effusion or pneumothorax. The patient has a mild dextroscoliosis of the dorsal spine. Impression NO ACUTE CARDIOPULMONARY ABNORMALITY. Echo No results found for this or any previous visit.           Assessment/Plan:    Active Hospital Problems Diagnosis Date Noted    Diverticulitis of large intestine with abscess without bleeding [K57.20] 01/18/2020    Hypokalemia [E87.6] 01/18/2020    ZACHARY (acute kidney injury) ([facility] [address] [N17.9] 01/18/2020      Diverticulitis of large intestine with abscess without bleeding  Assessment: CT abdomen and pelvis from [facility] ER shows acute sigmoid diverticulitis with small associated abscess, no leukocytosis, afebrile  Plan:   Admit  Consult general surgery  N.p.o. except for occasional ice chips  IV Cipro and Flagyl  IV fluids  Monitor CBC and electrolytes  Monitor for increased pain and distention    1/19 - wbc improved, pain mildly improved.   Conservative mgmt discussed with surgery      Hypokalemia  Assessment: mild 3.3, likely lack of dietary intake  Plan:   Replace and monitor  Check magnesium       ZACHARY (acute kidney injury) ([facility] [address]  Assessment: No history of CKD, likely dehydration   Plan:   Monitor renal function  Monitor intake and output  Avoid nephrotoxic drugs  Rehydrate with IV fluids   1/19 - improving with iv fluids    DVT proph - lovenox    Electronically signed by Don Pandey MD on 1/19/2020 at 8:49 AM

## 2020-01-19 NOTE — CONSULTS
05/15/2015    LABALBU 4.5 03/08/2012    CREATININE 1.27 01/19/2020    CALCIUM 8.6 01/19/2020    GFRAA 49.5 01/19/2020    LABGLOM 40.9 01/19/2020    GLUCOSE 104 01/19/2020    GLUCOSE 96 03/08/2012       IMPRESSION/RECOMMENDATIONS:      Sigmoid diverticulitis with small pericolic abscess. No role for percutaneous drainage. Would continue antibiotic therapy. No surgical intervention needed. I went over the course of nonoperative treatment as well as potential operative intervention if clinical condition deteriorates. She appears well-informed. She wishes to proceed with nonoperative treatment.

## 2020-01-20 VITALS
DIASTOLIC BLOOD PRESSURE: 47 MMHG | OXYGEN SATURATION: 98 % | TEMPERATURE: 98.1 F | HEART RATE: 60 BPM | SYSTOLIC BLOOD PRESSURE: 126 MMHG | RESPIRATION RATE: 16 BRPM | WEIGHT: 178.6 LBS | BODY MASS INDEX: 31.14 KG/M2

## 2020-01-20 LAB
ALBUMIN SERPL-MCNC: 3 G/DL (ref 3.5–4.6)
ALP BLD-CCNC: 46 U/L (ref 40–130)
ALT SERPL-CCNC: 6 U/L (ref 0–33)
ANION GAP SERPL CALCULATED.3IONS-SCNC: 13 MEQ/L (ref 9–15)
AST SERPL-CCNC: 14 U/L (ref 0–35)
BILIRUB SERPL-MCNC: 0.7 MG/DL (ref 0.2–0.7)
BUN BLDV-MCNC: 12 MG/DL (ref 8–23)
CALCIUM SERPL-MCNC: 8.6 MG/DL (ref 8.5–9.9)
CHLORIDE BLD-SCNC: 96 MEQ/L (ref 95–107)
CO2: 27 MEQ/L (ref 20–31)
CREAT SERPL-MCNC: 1.03 MG/DL (ref 0.5–0.9)
GFR AFRICAN AMERICAN: >60
GFR NON-AFRICAN AMERICAN: 52.1
GLOBULIN: 3 G/DL (ref 2.3–3.5)
GLUCOSE BLD-MCNC: 100 MG/DL (ref 70–99)
GLUCOSE BLD-MCNC: 104 MG/DL (ref 60–115)
GLUCOSE BLD-MCNC: 115 MG/DL (ref 60–115)
HCT VFR BLD CALC: 32.2 % (ref 37–47)
HEMOGLOBIN: 11.2 G/DL (ref 12–16)
MAGNESIUM: 1.7 MG/DL (ref 1.7–2.4)
MCH RBC QN AUTO: 31.9 PG (ref 27–31.3)
MCHC RBC AUTO-ENTMCNC: 34.7 % (ref 33–37)
MCV RBC AUTO: 92 FL (ref 82–100)
PDW BLD-RTO: 13.9 % (ref 11.5–14.5)
PERFORMED ON: NORMAL
PERFORMED ON: NORMAL
PLATELET # BLD: 182 K/UL (ref 130–400)
POTASSIUM REFLEX MAGNESIUM: 3.1 MEQ/L (ref 3.4–4.9)
RBC # BLD: 3.5 M/UL (ref 4.2–5.4)
SODIUM BLD-SCNC: 136 MEQ/L (ref 135–144)
TOTAL PROTEIN: 6 G/DL (ref 6.3–8)
WBC # BLD: 7.3 K/UL (ref 4.8–10.8)

## 2020-01-20 PROCEDURE — 6370000000 HC RX 637 (ALT 250 FOR IP): Performed by: INTERNAL MEDICINE

## 2020-01-20 PROCEDURE — 2500000003 HC RX 250 WO HCPCS: Performed by: HOSPITALIST

## 2020-01-20 PROCEDURE — 6370000000 HC RX 637 (ALT 250 FOR IP): Performed by: FAMILY MEDICINE

## 2020-01-20 PROCEDURE — 2580000003 HC RX 258: Performed by: HOSPITALIST

## 2020-01-20 PROCEDURE — 2700000000 HC OXYGEN THERAPY PER DAY

## 2020-01-20 PROCEDURE — 80053 COMPREHEN METABOLIC PANEL: CPT

## 2020-01-20 PROCEDURE — 99231 SBSQ HOSP IP/OBS SF/LOW 25: CPT | Performed by: INTERNAL MEDICINE

## 2020-01-20 PROCEDURE — 83735 ASSAY OF MAGNESIUM: CPT

## 2020-01-20 PROCEDURE — 36415 COLL VENOUS BLD VENIPUNCTURE: CPT

## 2020-01-20 PROCEDURE — C9113 INJ PANTOPRAZOLE SODIUM, VIA: HCPCS | Performed by: HOSPITALIST

## 2020-01-20 PROCEDURE — 85027 COMPLETE CBC AUTOMATED: CPT

## 2020-01-20 PROCEDURE — 6360000002 HC RX W HCPCS: Performed by: HOSPITALIST

## 2020-01-20 RX ORDER — POTASSIUM CHLORIDE 20 MEQ/1
40 TABLET, EXTENDED RELEASE ORAL ONCE
Status: COMPLETED | OUTPATIENT
Start: 2020-01-20 | End: 2020-01-20

## 2020-01-20 RX ORDER — CIPROFLOXACIN 500 MG/1
500 TABLET, FILM COATED ORAL 2 TIMES DAILY
Qty: 20 TABLET | Refills: 0 | Status: SHIPPED | OUTPATIENT
Start: 2020-01-20 | End: 2020-01-30

## 2020-01-20 RX ORDER — METRONIDAZOLE 500 MG/1
500 TABLET ORAL 3 TIMES DAILY
Qty: 30 TABLET | Refills: 0 | Status: SHIPPED | OUTPATIENT
Start: 2020-01-20 | End: 2020-01-30

## 2020-01-20 RX ADMIN — POTASSIUM CHLORIDE 40 MEQ: 20 TABLET, EXTENDED RELEASE ORAL at 15:59

## 2020-01-20 RX ADMIN — METRONIDAZOLE 500 MG: 500 INJECTION, SOLUTION INTRAVENOUS at 00:38

## 2020-01-20 RX ADMIN — PANTOPRAZOLE SODIUM 40 MG: 40 INJECTION, POWDER, FOR SOLUTION INTRAVENOUS at 10:03

## 2020-01-20 RX ADMIN — CIPROFLOXACIN 400 MG: 2 INJECTION, SOLUTION INTRAVENOUS at 05:21

## 2020-01-20 RX ADMIN — FERROUS SULFATE TAB 325 MG (65 MG ELEMENTAL FE) 325 MG: 325 (65 FE) TAB at 10:01

## 2020-01-20 RX ADMIN — Medication 10 ML: at 10:19

## 2020-01-20 RX ADMIN — ROPINIROLE HYDROCHLORIDE 0.5 MG: 0.5 TABLET, FILM COATED ORAL at 10:15

## 2020-01-20 RX ADMIN — Medication 10 ML: at 10:03

## 2020-01-20 RX ADMIN — ENOXAPARIN SODIUM 40 MG: 40 INJECTION SUBCUTANEOUS at 10:02

## 2020-01-20 RX ADMIN — LOPERAMIDE HYDROCHLORIDE 2 MG: 2 CAPSULE ORAL at 00:38

## 2020-01-20 RX ADMIN — METRONIDAZOLE 500 MG: 500 INJECTION, SOLUTION INTRAVENOUS at 10:02

## 2020-01-20 ASSESSMENT — PAIN SCALES - GENERAL
PAINLEVEL_OUTOF10: 0

## 2020-01-20 NOTE — PROGRESS NOTES
Assessment completed this shift. Alert and oriented x4. Denies pain or nausea at present. Ate about 25% of dinner. Tolerated well. Reviewed discharge instructions with pt. Verbalized understanding. Waiting for  to transport home.   Electronically signed by Mani Goodman RN on 1/20/2020 at 5:45 PM

## 2020-01-20 NOTE — PROGRESS NOTES
Hospitalist Progress Note      PCP: Migel Borden MD    Date of Admission: 1/17/2020    Chief Complaint:  Doing better, pain resolved, tolerating diet, wants to go home    Medications:  Reviewed    Infusion Medications    lactated ringers 100 mL/hr at 01/19/20 2055     Scheduled Medications    donepezil  5 mg Oral Nightly    ferrous sulfate  325 mg Oral Daily with breakfast    rOPINIRole  0.5 mg Oral Daily    traZODone  50 mg Oral Nightly    sodium chloride flush  10 mL Intravenous 2 times per day    enoxaparin  40 mg Subcutaneous Daily    ciprofloxacin  400 mg Intravenous Q12H    metroNIDAZOLE  500 mg Intravenous Q8H    pantoprazole  40 mg Intravenous Daily    And    sodium chloride (PF)  10 mL Intravenous Daily    [Held by provider] buPROPion  300 mg Oral QAM    [Held by provider] busPIRone  15 mg Oral BID    [Held by provider] losartan  50 mg Oral Daily    [Held by provider] potassium chloride  10 mEq Oral Daily    [Held by provider] simvastatin  20 mg Oral QPM     PRN Meds: sodium chloride flush, magnesium hydroxide, ondansetron, acetaminophen, morphine **OR** morphine      Intake/Output Summary (Last 24 hours) at 1/20/2020 1247  Last data filed at 1/20/2020 1019  Gross per 24 hour   Intake 3385 ml   Output --   Net 3385 ml       Exam:    BP (!) 126/47   Pulse 60   Temp 98.1 °F (36.7 °C) (Oral)   Resp 16   Wt 178 lb 9.6 oz (81 kg)   SpO2 98%   BMI 31.14 kg/m²     General appearance: No apparent distress, appears stated age and cooperative. Respiratory:  Clear to auscultation, bilaterally without Rales/Wheezes/Rhonchi. Cardiovascular: Regular rate and rhythm with normal S1/S2. Abdomen: Soft, non-tender, non-distended with normal bowel sounds. Musculoskeletal: No clubbing, cyanosis or edema bilaterally.       Labs:   Recent Labs     01/18/20  0612 01/19/20  0605 01/20/20  0556   WBC 11.0* 8.9 7.3   HGB 12.2 10.3* 11.2*   HCT 35.0* 29.6* 32.2*    170 182     Recent Labs 01/18/20  0612 01/19/20  0605 01/20/20  0556    136 136   K 3.5 3.3* 3.1*    100 96   CO2 25 28 27   BUN 22 18 12   CREATININE 1.52* 1.27* 1.03*   CALCIUM 8.8 8.6 8.6     Recent Labs     01/18/20  0612 01/19/20  0605 01/20/20  0556   AST 13 14 14   ALT 7 7 6   BILITOT 1.0* 1.0* 0.7   ALKPHOS 45 42 46     Recent Labs     01/17/20  1623   INR 1.0     No results for input(s): CKTOTAL, TROPONINI in the last 72 hours.     Urinalysis:      Lab Results   Component Value Date    NITRU Negative 01/17/2020    WBCUA 0-2 01/17/2020    BACTERIA None 01/17/2020    RBCUA 5-10 01/17/2020    BLOODU Moderate 01/17/2020    SPECGRAV 1.010 01/17/2020    GLUCOSEU Negative 01/17/2020    GLUCOSEU NEG 03/08/2012       Radiology:  No orders to display           Assessment/Plan:    75 y.o. female with history of diverticulosis, HTN, GERD, HLD who presented to the ER with complaints of left lower quadrant pain    Acute sigmoid diverticulitis with small associated abscess  - treated with IV Cipro and Flagyl, IVFs  - conservative treatment per surgical service  - clinically improved, tolerating diet  - OK to d/c home with outpatient f/u with PCP     ZACHARY  - improved with IVFs    Hypokalemia  - replaced      Diet: DIET LOW FIBER;    Code Status: DNR-CCA          Electronically signed by Skylar Grant MD on 1/20/2020 at 12:47 PM

## 2020-01-20 NOTE — DISCHARGE SUMMARY
Hospital Medicine Discharge Summary    Jovanni Murphy  :  1945  MRN:  [id number]    Admit date:  2020  Discharge date:  2020    Admitting Physician:  Donna Cortez MD  Primary Care Physician:  Casimiro Ulloa MD      Discharge Diagnoses:    Principal Problem:    Diverticulitis of large intestine with abscess without bleeding  Active Problems:    Hypokalemia    ZACHARY (acute kidney injury) ([address]  Resolved Problems:    * No resolved hospital problems. *      Hospital Course:   Jovanni Murphy is a 75 y.o. female that was admitted and treated at [facility] for the following medical issues:     Acute sigmoid diverticulitis with small associated abscess  - treated with IV Cipro and Flagyl, IVFs  - conservative treatment per surgical service  - clinically improved, tolerating diet  - OK to d/c home on PO Cipro and Flagyl with outpatient f/u with PCP      Patient was seen by the following consultants while admitted to [facility]:   Consults:  [address]    Significant Diagnostic Studies:    Ct Abdomen Pelvis W Iv Contrast Additional Contrast? None    Result Date: 2020  CT ABDOMEN PELVIS W IV CONTRAST HISTORY:   ABD PAIN . Lower abdominal pain that started today. Generalized discomfort. TECHNIQUE: CT of the abdomen and pelvis was performed using standard technique, scanning from just above the dome of the diaphragm to the symphysis pubis. Including delayed images through the kidneys. Sagittal and coronal reconstructions performed. Contrast: IV: 100 ml Isovue 370 Oral:  None. All CT scans at this facility use dose modulation, iterative reconstruction, and/or weight based dosing when appropriate to reduce radiation dose to as low as reasonably achievable. COMPARISON: CT 2015. RESULT: Liver: No suspicious mass or lesion. Focal fat deposition falciform ligament. Biliary: No bile duct dilation.   Possible stone or tortuous fold near the gallbladder neck. No pericholecystic fluid or gallbladder wall thickening. Pancreas: No mass or duct dilation. Spleen: No mass. No splenomegaly. Adrenals: No mass. Kidneys: Mild bilateral renal atrophy. No hydronephrosis or nephrolithiasis. Multiple bilateral low-attenuation benign cysts and other subcentimeter lesions too small to characterize but likely benign. Normal uptake and excretion of contrast into the renal collecting systems. GI tract: Diverticulosis, especially involving the sigmoid region. Short segment of perisigmoid wall thickening and adjacent stranding. Lymph nodes: No abdominal or pelvic lymphadenopathy. Mesentery/Peritoneum: Perisigmoid stranding. 2.1 x 2.0 x 2.3 cm perisigmoid fluid collection. Nakul Dull No pneumoperitoneum. Retroperitoneum: No mass. Vasculature: The celiac axis and SMA are patent. The portal vein and branches, splenic vein, SMV, and hepatic veins are patent. Moderate arterial atherosclerotic disease without aneurysm. Pelvis: IUD within the uterus. Bladder decompressed. Bones: Age-indeterminate compression deformity of the L4 vertebral body with approximately 40% height loss, new from CT 8/4/2015. Compression deformity of the L1 vertebral body with vertebral body augmentation. Few bone islands in the left hemipelvis. Degenerative changes. Soft tissues: Unremarkable. Lower thorax: Unremarkable. Acute sigmoid diverticulitis, with small associated abscess. Age-indeterminate compression deformity of L4, with 40% height loss, new from CT 8/4/2015. ==========       Discharge Medications:       Jaimee Velasquez   Home Medication Instructions EUU:[id number]    Printed on:01/20/20 1310   Medication Information                      busPIRone (BUSPAR) 15 MG tablet  Take 1 tablet by mouth 2 times daily. Calcium Carbonate-Vitamin D (CALCIUM 600+D) 600-200 MG-UNIT TABS  Take  by mouth 2 times daily.                ciprofloxacin (CIPRO) 500 MG tablet  Take 1 tablet by mouth 2 times daily for 10 days             CPAP Machine MISC  by CPAP route Per  setting is at 12 and patient needs a full mask             donepezil (ARICEPT) 10 MG tablet  Take 5 mg by mouth nightly             escitalopram (LEXAPRO) 10 MG tablet  Take 10 mg by mouth daily             ferrous sulfate 325 (65 Fe) MG tablet  Take 325 mg by mouth daily (with breakfast)             Lactase (DAIRY AID PO)  Take 1 tablet by mouth daily             losartan-hydrochlorothiazide (HYZAAR) 100-12.5 MG per tablet  Take 1 tablet by mouth daily             magnesium gluconate (MAGONATE) 500 MG tablet  Take 250 mg by mouth daily             Melatonin 10 MG CAPS  Take by mouth             metroNIDAZOLE (FLAGYL) 500 MG tablet  Take 1 tablet by mouth 3 times daily for 10 days             pantoprazole (PROTONIX) 40 MG tablet  Take 40 mg by mouth daily             rOPINIRole (REQUIP) 0.5 MG tablet  Take 0.5 mg by mouth daily Take 1 hour before bedtime             simvastatin (ZOCOR) 20 MG tablet  Take 1 tablet by mouth every evening. traZODone (DESYREL) 50 MG tablet  Take 50 mg by mouth nightly                 Disposition:   Discharged to Home. Any C needs that were indicated and/or required as been addressed and set up by Social Work. Condition at discharge: Pt was medically stable at the time of discharge. Significant improvement in clinical condition compared to initial condition at presentation to hospital    Activity: activity as tolerated, fall precautions. Total time taken for discharging this patient: 40 minutes. Greater than 70% of time was spent focused exclusively on this patient. Time was taken to review chart, discuss plans with consultants, reconciling medications, discussing plan answering questions with patient.      SignedDaved Cherry  1/20/2020, 1:10

## 2020-01-20 NOTE — PROGRESS NOTES
in the care of your patient.   Feel free to contact me with any questions or concerns    Kit Arzola MD

## 2020-03-05 ENCOUNTER — HOSPITAL ENCOUNTER (OUTPATIENT)
Dept: LAB | Age: 75
Discharge: HOME | End: 2020-03-05
Payer: MEDICARE

## 2020-03-05 ENCOUNTER — HOSPITAL ENCOUNTER (OUTPATIENT)
Dept: WOMENS IMAGING | Age: 75
Discharge: HOME | End: 2020-03-07
Payer: MEDICARE

## 2020-03-05 ENCOUNTER — HOSPITAL ENCOUNTER (OUTPATIENT)
Dept: ULTRASOUND IMAGING | Age: 75
Discharge: HOME | End: 2020-03-07
Payer: MEDICARE

## 2020-03-05 LAB
ANION GAP SERPL CALCULATED.3IONS-SCNC: 18 MEQ/L (ref 9–15)
BUN BLDV-MCNC: 21 MG/DL (ref 8–23)
CALCIUM SERPL-MCNC: 9.3 MG/DL (ref 8.5–9.9)
CHLORIDE BLD-SCNC: 91 MEQ/L (ref 95–107)
CHOLESTEROL, TOTAL: 143 MG/DL (ref 0–199)
CO2: 29 MEQ/L (ref 20–31)
CREAT SERPL-MCNC: 1.79 MG/DL (ref 0.5–0.9)
GFR AFRICAN AMERICAN: 33.3
GFR NON-AFRICAN AMERICAN: 27.5
GLUCOSE BLD-MCNC: 99 MG/DL (ref 70–99)
HDLC SERPL-MCNC: 84 MG/DL (ref 40–59)
LDL CHOLESTEROL CALCULATED: 49 MG/DL (ref 0–129)
POTASSIUM SERPL-SCNC: 3.1 MEQ/L (ref 3.4–4.9)
SODIUM BLD-SCNC: 138 MEQ/L (ref 135–144)
TRIGL SERPL-MCNC: 52 MG/DL (ref 0–150)

## 2020-03-05 PROCEDURE — 77080 DXA BONE DENSITY AXIAL: CPT

## 2020-03-05 PROCEDURE — 80061 LIPID PANEL: CPT

## 2020-03-05 PROCEDURE — 86803 HEPATITIS C AB TEST: CPT

## 2020-03-05 PROCEDURE — 36415 COLL VENOUS BLD VENIPUNCTURE: CPT

## 2020-03-05 PROCEDURE — 93880 EXTRACRANIAL BILAT STUDY: CPT

## 2020-03-05 PROCEDURE — 80048 BASIC METABOLIC PNL TOTAL CA: CPT

## 2020-03-05 PROCEDURE — 77067 SCR MAMMO BI INCL CAD: CPT

## 2020-03-07 LAB
MISCELLANEOUS LAB TEST ORDER: NORMAL
WHOPPER PROMPT: NORMAL

## 2023-03-22 ENCOUNTER — OFFICE VISIT (OUTPATIENT)
Dept: GASTROENTEROLOGY | Age: 78
End: 2023-03-22
Payer: MEDICARE

## 2023-03-22 VITALS
HEIGHT: 63 IN | BODY MASS INDEX: 29.59 KG/M2 | HEART RATE: 89 BPM | SYSTOLIC BLOOD PRESSURE: 118 MMHG | DIASTOLIC BLOOD PRESSURE: 70 MMHG | OXYGEN SATURATION: 93 % | WEIGHT: 167 LBS

## 2023-03-22 DIAGNOSIS — R19.7 DIARRHEA, UNSPECIFIED TYPE: Primary | ICD-10-CM

## 2023-03-22 DIAGNOSIS — K92.1 MELENA: ICD-10-CM

## 2023-03-22 PROCEDURE — 1090F PRES/ABSN URINE INCON ASSESS: CPT | Performed by: SPECIALIST

## 2023-03-22 PROCEDURE — 1123F ACP DISCUSS/DSCN MKR DOCD: CPT | Performed by: SPECIALIST

## 2023-03-22 PROCEDURE — 1036F TOBACCO NON-USER: CPT | Performed by: SPECIALIST

## 2023-03-22 PROCEDURE — G8427 DOCREV CUR MEDS BY ELIG CLIN: HCPCS | Performed by: SPECIALIST

## 2023-03-22 PROCEDURE — 3074F SYST BP LT 130 MM HG: CPT | Performed by: SPECIALIST

## 2023-03-22 PROCEDURE — 3078F DIAST BP <80 MM HG: CPT | Performed by: SPECIALIST

## 2023-03-22 PROCEDURE — G8417 CALC BMI ABV UP PARAM F/U: HCPCS | Performed by: SPECIALIST

## 2023-03-22 PROCEDURE — 99203 OFFICE O/P NEW LOW 30 MIN: CPT | Performed by: SPECIALIST

## 2023-03-22 PROCEDURE — G8484 FLU IMMUNIZE NO ADMIN: HCPCS | Performed by: SPECIALIST

## 2023-03-22 PROCEDURE — G8399 PT W/DXA RESULTS DOCUMENT: HCPCS | Performed by: SPECIALIST

## 2023-03-22 RX ORDER — MEMANTINE HYDROCHLORIDE 10 MG/1
TABLET ORAL
COMMUNITY
Start: 2023-01-29

## 2023-03-22 RX ORDER — ATORVASTATIN CALCIUM 10 MG/1
TABLET, FILM COATED ORAL
COMMUNITY
Start: 2023-01-29

## 2023-03-22 RX ORDER — POTASSIUM CHLORIDE 20 MEQ/1
TABLET, EXTENDED RELEASE ORAL
COMMUNITY
Start: 2020-08-17

## 2023-03-22 ASSESSMENT — ENCOUNTER SYMPTOMS
DIARRHEA: 1
VOMITING: 0
RECTAL PAIN: 0
NAUSEA: 0
CONSTIPATION: 0
ABDOMINAL PAIN: 0
ABDOMINAL DISTENTION: 0
ANAL BLEEDING: 0
EYES NEGATIVE: 1
RESPIRATORY NEGATIVE: 1
BLOOD IN STOOL: 0

## 2023-03-22 NOTE — PROGRESS NOTES
(around 4/19/2023). Brissa Lei MD   Staff Gastroenterologist  [facility]    Please note this report has been partially produced using speech recognition software and may cause contain errors related to thatsystem including grammar, punctuation and spelling as well as words and phrases that may seem inappropriate. If there are questions or concerns please feel free to contact me to clarify.

## 2023-03-26 LAB
ADV 40+41 DNA STL QL NAA+NON-PROBE: NOT DETECTED
C CAYETANENSIS DNA STL QL NAA+NON-PROBE: NOT DETECTED
C COLI+JEJ+UPSA DNA STL QL NAA+NON-PROBE: NOT DETECTED
C DIFF TOX A+B STL QL IA: NORMAL
CRYPTOSP AG STL QL IA: ABNORMAL
CRYPTOSP DNA STL QL NAA+NON-PROBE: NOT DETECTED
E HISTOLYT DNA STL QL NAA+NON-PROBE: NOT DETECTED
EAEC PAA PLAS AGGR+AATA ST NAA+NON-PRB: NOT DETECTED
EC STX1+STX2 GENES STL QL NAA+NON-PROBE: NOT DETECTED
EPEC EAE GENE STL QL NAA+NON-PROBE: NOT DETECTED
ETEC LTA+ST1A+ST1B TOX ST NAA+NON-PROBE: NOT DETECTED
G LAMBLIA AG STL QL IA: ABNORMAL
G LAMBLIA DNA STL QL NAA+NON-PROBE: NOT DETECTED
GI PATH DNA+RNA PNL STL NAA+NON-PROBE: NOT DETECTED
HEMOCCULT STL QL IA: NORMAL
LACTOFERRIN, FECAL: POSITIVE
NOROVIRUS GI+II RNA STL QL NAA+NON-PROBE: NOT DETECTED
ORGANISM: ABNORMAL
P SHIGELLOIDES DNA STL QL NAA+NON-PROBE: NOT DETECTED
RVA RNA STL QL NAA+NON-PROBE: NOT DETECTED
S ENT+BONG DNA STL QL NAA+NON-PROBE: NOT DETECTED
SAPO I+II+IV+V RNA STL QL NAA+NON-PROBE: NOT DETECTED
SHIGELLA SP+EIEC IPAH ST NAA+NON-PROBE: NOT DETECTED
V CHOL+PARA+VUL DNA STL QL NAA+NON-PROBE: NOT DETECTED
V CHOLERAE DNA STL QL NAA+NON-PROBE: NOT DETECTED
Y ENTEROCOL DNA STL QL NAA+NON-PROBE: NOT DETECTED

## 2023-03-29 LAB — PH STL: 8 [PH] (ref 5–8.5)

## 2023-03-30 LAB — ELASTASE PANC STL-MCNT: 352 UG/G

## 2023-05-10 ENCOUNTER — TELEPHONE (OUTPATIENT)
Dept: GASTROENTEROLOGY | Age: 78
End: 2023-05-10

## 2023-08-20 ENCOUNTER — APPOINTMENT (OUTPATIENT)
Dept: GENERAL RADIOLOGY | Age: 78
End: 2023-08-20
Payer: MEDICARE

## 2023-08-20 ENCOUNTER — APPOINTMENT (OUTPATIENT)
Dept: CT IMAGING | Age: 78
End: 2023-08-20
Payer: MEDICARE

## 2023-08-20 ENCOUNTER — HOSPITAL ENCOUNTER (EMERGENCY)
Age: 78
Discharge: OTHER INSTITUTION | End: 2023-08-20
Attending: EMERGENCY MEDICINE
Payer: MEDICARE

## 2023-08-20 VITALS
DIASTOLIC BLOOD PRESSURE: 55 MMHG | WEIGHT: 167 LBS | OXYGEN SATURATION: 99 % | RESPIRATION RATE: 16 BRPM | HEIGHT: 63 IN | HEART RATE: 76 BPM | TEMPERATURE: 98 F | SYSTOLIC BLOOD PRESSURE: 139 MMHG | BODY MASS INDEX: 29.59 KG/M2

## 2023-08-20 DIAGNOSIS — S32.040A COMPRESSION FRACTURE OF L4 VERTEBRA, INITIAL ENCOUNTER (HCC): ICD-10-CM

## 2023-08-20 DIAGNOSIS — S32.050A COMPRESSION FRACTURE OF L5 VERTEBRA, INITIAL ENCOUNTER (HCC): ICD-10-CM

## 2023-08-20 DIAGNOSIS — S70.02XA CONTUSION OF LEFT HIP, INITIAL ENCOUNTER: Primary | ICD-10-CM

## 2023-08-20 PROCEDURE — 96372 THER/PROPH/DIAG INJ SC/IM: CPT

## 2023-08-20 PROCEDURE — 99285 EMERGENCY DEPT VISIT HI MDM: CPT

## 2023-08-20 PROCEDURE — 96374 THER/PROPH/DIAG INJ IV PUSH: CPT

## 2023-08-20 PROCEDURE — 73502 X-RAY EXAM HIP UNI 2-3 VIEWS: CPT

## 2023-08-20 PROCEDURE — 6830039000 HC L3 TRAUMA ALERT

## 2023-08-20 PROCEDURE — 2500000003 HC RX 250 WO HCPCS: Performed by: EMERGENCY MEDICINE

## 2023-08-20 PROCEDURE — 73560 X-RAY EXAM OF KNEE 1 OR 2: CPT

## 2023-08-20 PROCEDURE — 6360000002 HC RX W HCPCS: Performed by: EMERGENCY MEDICINE

## 2023-08-20 PROCEDURE — 70450 CT HEAD/BRAIN W/O DYE: CPT

## 2023-08-20 PROCEDURE — 96375 TX/PRO/DX INJ NEW DRUG ADDON: CPT

## 2023-08-20 PROCEDURE — 72192 CT PELVIS W/O DYE: CPT

## 2023-08-20 RX ORDER — MORPHINE SULFATE 2 MG/ML
2 INJECTION, SOLUTION INTRAMUSCULAR; INTRAVENOUS ONCE
Status: COMPLETED | OUTPATIENT
Start: 2023-08-20 | End: 2023-08-20

## 2023-08-20 RX ORDER — MORPHINE SULFATE 4 MG/ML
4 INJECTION, SOLUTION INTRAMUSCULAR; INTRAVENOUS
Status: COMPLETED | OUTPATIENT
Start: 2023-08-20 | End: 2023-08-20

## 2023-08-20 RX ORDER — MORPHINE SULFATE 2 MG/ML
2 INJECTION, SOLUTION INTRAMUSCULAR; INTRAVENOUS
Status: DISCONTINUED | OUTPATIENT
Start: 2023-08-20 | End: 2023-08-20

## 2023-08-20 RX ORDER — ONDANSETRON 2 MG/ML
4 INJECTION INTRAMUSCULAR; INTRAVENOUS ONCE
Status: COMPLETED | OUTPATIENT
Start: 2023-08-20 | End: 2023-08-20

## 2023-08-20 RX ADMIN — MORPHINE SULFATE 4 MG: 4 INJECTION, SOLUTION INTRAMUSCULAR; INTRAVENOUS at 14:19

## 2023-08-20 RX ADMIN — MORPHINE SULFATE 2 MG: 2 INJECTION, SOLUTION INTRAMUSCULAR; INTRAVENOUS at 18:38

## 2023-08-20 RX ADMIN — ONDANSETRON 4 MG: 2 INJECTION INTRAMUSCULAR; INTRAVENOUS at 14:19

## 2023-08-20 ASSESSMENT — PAIN - FUNCTIONAL ASSESSMENT
PAIN_FUNCTIONAL_ASSESSMENT: PREVENTS OR INTERFERES WITH ALL ACTIVE AND SOME PASSIVE ACTIVITIES
PAIN_FUNCTIONAL_ASSESSMENT: PREVENTS OR INTERFERES WITH MANY ACTIVE NOT PASSIVE ACTIVITIES

## 2023-08-20 ASSESSMENT — PAIN DESCRIPTION - ORIENTATION
ORIENTATION: LEFT

## 2023-08-20 ASSESSMENT — PAIN DESCRIPTION - DESCRIPTORS
DESCRIPTORS: OTHER (COMMENT)
DESCRIPTORS: ACHING;STABBING;THROBBING
DESCRIPTORS: ACHING

## 2023-08-20 ASSESSMENT — PAIN DESCRIPTION - LOCATION
LOCATION: LEG

## 2023-08-20 ASSESSMENT — LIFESTYLE VARIABLES
HOW OFTEN DO YOU HAVE A DRINK CONTAINING ALCOHOL: NEVER
HOW MANY STANDARD DRINKS CONTAINING ALCOHOL DO YOU HAVE ON A TYPICAL DAY: PATIENT DOES NOT DRINK

## 2023-08-20 ASSESSMENT — PAIN DESCRIPTION - PAIN TYPE: TYPE: ACUTE PAIN

## 2023-08-20 ASSESSMENT — PAIN SCALES - GENERAL
PAINLEVEL_OUTOF10: 5
PAINLEVEL_OUTOF10: 8
PAINLEVEL_OUTOF10: 10

## 2023-08-20 ASSESSMENT — PAIN DESCRIPTION - FREQUENCY: FREQUENCY: CONTINUOUS

## 2023-08-20 ASSESSMENT — PAIN DESCRIPTION - ONSET: ONSET: ON-GOING

## 2023-08-20 NOTE — ED NOTES
Pt lying in bed at this time   Pt has slow even breathes and remains alert and oriented.    Pt  at bedside at this time      Rafita Haynes RN  08/20/23 5515

## 2023-08-20 NOTE — ED NOTES
Pt left at this time to Samantha  Pt given IM morphine at time of discharge   Pt comes of left leg pain  Dr. Jean-Pierre Cruz at bedside at this time  Call placed to [facility] at this time   Spoke with Gutierrez Valdez nurse manager at [facility] of Christelle Uriostegui  08/20/23 111 [address], RN  08/20/23 4778

## 2023-08-20 NOTE — ED PROVIDER NOTES
SpO2: 94% (!) 86% 99% 99%   Weight:       Height: This time patient main stable emergency department. Patient was provided with morphine for discomfort and analgesia. At this time consultation made with primary care physician. Patient be discharged back to skilled nursing facility. Patient will follow-up should condition worsening fasting. Otherwise analgesia appropriate and outpatient follow-up. All parties present understanding. Very appreciative of care. Medical Decision Making  Amount and/or Complexity of Data Reviewed  Radiology: ordered. Risk  Prescription drug management. Coding     CONSULTS:  None    PROCEDURES:  Unless otherwise noted below, none     Procedures    FINAL IMPRESSION      1. Contusion of left hip, initial encounter    2. Compression fracture of L4 vertebra, initial encounter ([facility])    3.  Compression fracture of L5 vertebra, initial encounter [facility])        DISPOSITION/PLAN   DISPOSITION Decision To Discharge 08/20/2023 03:33:27 PM      PATIENT REFERRED TO:  Chel Farooq MD  [address]   [phone]    Call today  for follow up Emergency Department visit      DISCHARGE MEDICATIONS:  New Prescriptions    No medications on file          (Please note that portions of this note were completed with a voice recognitionprogram.  Efforts were made to edit the dictations but occasionally words are mis-transcribed.)    Sandi Hilton MD (electronically signed)  Attending Emergency Physician         Sandi Hilton MD  08/20/23 210 Niall Gillis MD  08/20/23 4964

## 2023-08-20 NOTE — ED NOTES
Pt awaiting transport back to [facility]   Pt  aware     Warfield Leaks, RN  08/20/23 4020

## 2025-06-07 ENCOUNTER — HOSPITAL ENCOUNTER (EMERGENCY)
Facility: HOSPITAL | Age: 80
Discharge: HOME | End: 2025-06-07
Attending: EMERGENCY MEDICINE
Payer: MEDICARE

## 2025-06-07 ENCOUNTER — APPOINTMENT (OUTPATIENT)
Dept: RADIOLOGY | Facility: HOSPITAL | Age: 80
End: 2025-06-07
Payer: MEDICARE

## 2025-06-07 VITALS
OXYGEN SATURATION: 95 % | RESPIRATION RATE: 16 BRPM | HEART RATE: 54 BPM | DIASTOLIC BLOOD PRESSURE: 73 MMHG | BODY MASS INDEX: 29.12 KG/M2 | WEIGHT: 181.22 LBS | SYSTOLIC BLOOD PRESSURE: 154 MMHG | HEIGHT: 66 IN | TEMPERATURE: 97.9 F

## 2025-06-07 DIAGNOSIS — S92.302A CLOSED NONDISPLACED FRACTURE OF METATARSAL BONE OF LEFT FOOT, UNSPECIFIED METATARSAL, INITIAL ENCOUNTER: Primary | ICD-10-CM

## 2025-06-07 DIAGNOSIS — S32.000A CLOSED COMPRESSION FRACTURE OF LUMBOSACRAL SPINE, INITIAL ENCOUNTER (MULTI): ICD-10-CM

## 2025-06-07 LAB
ABO GROUP (TYPE) IN BLOOD: NORMAL
ALBUMIN SERPL BCP-MCNC: 3.6 G/DL (ref 3.4–5)
ALP SERPL-CCNC: 87 U/L (ref 33–136)
ALT SERPL W P-5'-P-CCNC: 10 U/L (ref 7–45)
ANION GAP SERPL CALC-SCNC: 9 MMOL/L (ref 10–20)
ANTIBODY SCREEN: NORMAL
AST SERPL W P-5'-P-CCNC: 16 U/L (ref 9–39)
BASOPHILS # BLD AUTO: 0.04 X10*3/UL (ref 0–0.1)
BASOPHILS NFR BLD AUTO: 0.6 %
BILIRUB SERPL-MCNC: 0.7 MG/DL (ref 0–1.2)
BUN SERPL-MCNC: 15 MG/DL (ref 6–23)
CALCIUM SERPL-MCNC: 8.7 MG/DL (ref 8.6–10.3)
CARDIAC TROPONIN I PNL SERPL HS: 8 NG/L (ref 0–13)
CHLORIDE SERPL-SCNC: 104 MMOL/L (ref 98–107)
CO2 SERPL-SCNC: 28 MMOL/L (ref 21–32)
CREAT SERPL-MCNC: 1.21 MG/DL (ref 0.5–1.05)
EGFRCR SERPLBLD CKD-EPI 2021: 45 ML/MIN/1.73M*2
EOSINOPHIL # BLD AUTO: 0.16 X10*3/UL (ref 0–0.4)
EOSINOPHIL NFR BLD AUTO: 2.3 %
ERYTHROCYTE [DISTWIDTH] IN BLOOD BY AUTOMATED COUNT: 13.2 % (ref 11.5–14.5)
GLUCOSE SERPL-MCNC: 91 MG/DL (ref 74–99)
HCT VFR BLD AUTO: 39.8 % (ref 36–46)
HGB BLD-MCNC: 13.1 G/DL (ref 12–16)
IMM GRANULOCYTES # BLD AUTO: 0.03 X10*3/UL (ref 0–0.5)
IMM GRANULOCYTES NFR BLD AUTO: 0.4 % (ref 0–0.9)
INR PPP: 1 (ref 0.9–1.1)
LACTATE SERPL-SCNC: 0.6 MMOL/L (ref 0.4–2)
LYMPHOCYTES # BLD AUTO: 1.35 X10*3/UL (ref 0.8–3)
LYMPHOCYTES NFR BLD AUTO: 19.3 %
MCH RBC QN AUTO: 30.9 PG (ref 26–34)
MCHC RBC AUTO-ENTMCNC: 32.9 G/DL (ref 32–36)
MCV RBC AUTO: 94 FL (ref 80–100)
MONOCYTES # BLD AUTO: 0.5 X10*3/UL (ref 0.05–0.8)
MONOCYTES NFR BLD AUTO: 7.1 %
NEUTROPHILS # BLD AUTO: 4.93 X10*3/UL (ref 1.6–5.5)
NEUTROPHILS NFR BLD AUTO: 70.3 %
NRBC BLD-RTO: 0 /100 WBCS (ref 0–0)
PLATELET # BLD AUTO: 229 X10*3/UL (ref 150–450)
POTASSIUM SERPL-SCNC: 4.4 MMOL/L (ref 3.5–5.3)
PROT SERPL-MCNC: 6.4 G/DL (ref 6.4–8.2)
PROTHROMBIN TIME: 11.5 SECONDS (ref 9.8–12.4)
RBC # BLD AUTO: 4.24 X10*6/UL (ref 4–5.2)
RH FACTOR (ANTIGEN D): NORMAL
SODIUM SERPL-SCNC: 137 MMOL/L (ref 136–145)
WBC # BLD AUTO: 7 X10*3/UL (ref 4.4–11.3)

## 2025-06-07 PROCEDURE — 70450 CT HEAD/BRAIN W/O DYE: CPT | Performed by: STUDENT IN AN ORGANIZED HEALTH CARE EDUCATION/TRAINING PROGRAM

## 2025-06-07 PROCEDURE — 86900 BLOOD TYPING SEROLOGIC ABO: CPT | Performed by: EMERGENCY MEDICINE

## 2025-06-07 PROCEDURE — 72128 CT CHEST SPINE W/O DYE: CPT | Performed by: STUDENT IN AN ORGANIZED HEALTH CARE EDUCATION/TRAINING PROGRAM

## 2025-06-07 PROCEDURE — 80053 COMPREHEN METABOLIC PANEL: CPT | Performed by: EMERGENCY MEDICINE

## 2025-06-07 PROCEDURE — 84484 ASSAY OF TROPONIN QUANT: CPT | Performed by: EMERGENCY MEDICINE

## 2025-06-07 PROCEDURE — 72131 CT LUMBAR SPINE W/O DYE: CPT | Performed by: STUDENT IN AN ORGANIZED HEALTH CARE EDUCATION/TRAINING PROGRAM

## 2025-06-07 PROCEDURE — 72125 CT NECK SPINE W/O DYE: CPT | Performed by: STUDENT IN AN ORGANIZED HEALTH CARE EDUCATION/TRAINING PROGRAM

## 2025-06-07 PROCEDURE — 29515 APPLICATION SHORT LEG SPLINT: CPT | Mod: LT

## 2025-06-07 PROCEDURE — 99285 EMERGENCY DEPT VISIT HI MDM: CPT | Mod: 25 | Performed by: EMERGENCY MEDICINE

## 2025-06-07 PROCEDURE — 72125 CT NECK SPINE W/O DYE: CPT

## 2025-06-07 PROCEDURE — 70450 CT HEAD/BRAIN W/O DYE: CPT

## 2025-06-07 PROCEDURE — 96374 THER/PROPH/DIAG INJ IV PUSH: CPT

## 2025-06-07 PROCEDURE — 71250 CT THORAX DX C-: CPT | Performed by: STUDENT IN AN ORGANIZED HEALTH CARE EDUCATION/TRAINING PROGRAM

## 2025-06-07 PROCEDURE — 74176 CT ABD & PELVIS W/O CONTRAST: CPT

## 2025-06-07 PROCEDURE — 83605 ASSAY OF LACTIC ACID: CPT | Performed by: EMERGENCY MEDICINE

## 2025-06-07 PROCEDURE — 2500000004 HC RX 250 GENERAL PHARMACY W/ HCPCS (ALT 636 FOR OP/ED): Performed by: EMERGENCY MEDICINE

## 2025-06-07 PROCEDURE — 85025 COMPLETE CBC W/AUTO DIFF WBC: CPT | Performed by: EMERGENCY MEDICINE

## 2025-06-07 PROCEDURE — 73630 X-RAY EXAM OF FOOT: CPT | Mod: LEFT SIDE | Performed by: RADIOLOGY

## 2025-06-07 PROCEDURE — 73552 X-RAY EXAM OF FEMUR 2/>: CPT | Mod: RT

## 2025-06-07 PROCEDURE — 74176 CT ABD & PELVIS W/O CONTRAST: CPT | Performed by: STUDENT IN AN ORGANIZED HEALTH CARE EDUCATION/TRAINING PROGRAM

## 2025-06-07 PROCEDURE — 73630 X-RAY EXAM OF FOOT: CPT | Mod: LT

## 2025-06-07 PROCEDURE — 36415 COLL VENOUS BLD VENIPUNCTURE: CPT | Performed by: EMERGENCY MEDICINE

## 2025-06-07 PROCEDURE — 86901 BLOOD TYPING SEROLOGIC RH(D): CPT | Performed by: EMERGENCY MEDICINE

## 2025-06-07 PROCEDURE — 73552 X-RAY EXAM OF FEMUR 2/>: CPT | Mod: RIGHT SIDE | Performed by: RADIOLOGY

## 2025-06-07 PROCEDURE — 85610 PROTHROMBIN TIME: CPT | Performed by: EMERGENCY MEDICINE

## 2025-06-07 RX ORDER — MORPHINE SULFATE 4 MG/ML
4 INJECTION, SOLUTION INTRAMUSCULAR; INTRAVENOUS ONCE
Status: DISCONTINUED | OUTPATIENT
Start: 2025-06-07 | End: 2025-06-07 | Stop reason: HOSPADM

## 2025-06-07 RX ORDER — OXYCODONE AND ACETAMINOPHEN 5; 325 MG/1; MG/1
1 TABLET ORAL EVERY 6 HOURS PRN
Qty: 8 TABLET | Refills: 0 | Status: SHIPPED | OUTPATIENT
Start: 2025-06-07 | End: 2025-06-10

## 2025-06-07 RX ORDER — FENTANYL CITRATE 50 UG/ML
25 INJECTION, SOLUTION INTRAMUSCULAR; INTRAVENOUS ONCE
Refills: 0 | Status: COMPLETED | OUTPATIENT
Start: 2025-06-07 | End: 2025-06-07

## 2025-06-07 RX ORDER — ONDANSETRON HYDROCHLORIDE 2 MG/ML
4 INJECTION, SOLUTION INTRAVENOUS ONCE
Status: DISCONTINUED | OUTPATIENT
Start: 2025-06-07 | End: 2025-06-07 | Stop reason: HOSPADM

## 2025-06-07 RX ADMIN — FENTANYL CITRATE 25 MCG: 50 INJECTION INTRAMUSCULAR; INTRAVENOUS at 11:48

## 2025-06-07 ASSESSMENT — LIFESTYLE VARIABLES
EVER FELT BAD OR GUILTY ABOUT YOUR DRINKING: NO
HAVE PEOPLE ANNOYED YOU BY CRITICIZING YOUR DRINKING: NO
EVER HAD A DRINK FIRST THING IN THE MORNING TO STEADY YOUR NERVES TO GET RID OF A HANGOVER: NO
TOTAL SCORE: 0
HAVE YOU EVER FELT YOU SHOULD CUT DOWN ON YOUR DRINKING: NO

## 2025-06-07 ASSESSMENT — COLUMBIA-SUICIDE SEVERITY RATING SCALE - C-SSRS
6. HAVE YOU EVER DONE ANYTHING, STARTED TO DO ANYTHING, OR PREPARED TO DO ANYTHING TO END YOUR LIFE?: NO
2. HAVE YOU ACTUALLY HAD ANY THOUGHTS OF KILLING YOURSELF?: NO
1. IN THE PAST MONTH, HAVE YOU WISHED YOU WERE DEAD OR WISHED YOU COULD GO TO SLEEP AND NOT WAKE UP?: NO

## 2025-06-07 ASSESSMENT — PAIN - FUNCTIONAL ASSESSMENT: PAIN_FUNCTIONAL_ASSESSMENT: 0-10

## 2025-06-07 ASSESSMENT — PAIN DESCRIPTION - ORIENTATION: ORIENTATION: LOWER;MID

## 2025-06-07 ASSESSMENT — PAIN DESCRIPTION - LOCATION: LOCATION: BACK

## 2025-06-07 ASSESSMENT — PAIN DESCRIPTION - PAIN TYPE: TYPE: ACUTE PAIN

## 2025-06-07 ASSESSMENT — PAIN DESCRIPTION - FREQUENCY: FREQUENCY: CONSTANT/CONTINUOUS

## 2025-06-07 ASSESSMENT — PAIN DESCRIPTION - DESCRIPTORS: DESCRIPTORS: ACHING

## 2025-06-07 ASSESSMENT — PAIN SCALES - GENERAL
PAINLEVEL_OUTOF10: 7
PAINLEVEL_OUTOF10: 7

## 2025-06-07 NOTE — ED PROVIDER NOTES
HPI   Chief Complaint   Patient presents with    Fall     Patient arrived via EMS with complaints of fall at nursing home that was unwitnessed, pt complaining of lower back pain, right leg pain and left foot pain. Pt has hx of dementia, pt is currently a&o x3 which is baseline.     Back Pain    Leg Pain    Foot Injury       HPI:80-year-old female history of dementia currently at a memory care facility presents after she states that she fell yesterday.  She is mostly complaining of left foot pain.  Patient's  states that she lives in the memory care unit.  He states that she is mostly wheelchair-bound.  He states that she tried to get up and that is how she felt.  He states that this happened yesterday.    Family HX: Denies any significant/pertinent family history.  Social Hx: Denies ETOH or drug use.  Review of systems:  Gen.: No weight loss, fatigue, anorexia, insomnia, fever.   Eyes: No vision loss, double vision, drainage, eye pain.   ENT: No pharyngitis, dry mouth.   Cardiac: No chest pain, palpitations, syncope, near syncope.   Pulmonary: No shortness of breath, cough, hemoptysis.   Heme/lymph: No swollen glands, fever, bleeding.   GI: No abdominal pain, change in bowel habits, melena, hematemesis, hematochezia, nausea, vomiting, diarrhea.   : No discharge, dysuria, frequency, urgency, hematuria.   Musculoskeletal: Left foot pain  Skin: No rashes.   Psych: No depression, anxiety, suicidality, homicidality.   Review of systems is otherwise negative unless stated above or in history of present illness.    Physical Exam:    Appearance: Alert, oriented , cooperative,  in no acute distress. Well nourished & well hydrated.    Skin: Intact,  dry skin, no lesions, rash, petechiae or purpura.     Eyes: PERRLA, EOMs intact,  Conjunctiva pink with no redness or exudates. Eyelids without lesions. No scleral icterus.     ENT: Hearing grossly intact. External auditory canals patent, tympanic membranes intact with  visible landmarks. Nares patent, mucus membranes moist. Dentition without lesions. Pharynx clear, uvula midline.     Neck: Supple, without meningismus. Thyroid not palpable. Trachea at midline. No lymphadenopathy.    Pulmonary: Clear bilaterally with good chest wall excursion. No rales, rhonchi or wheezing. No accessory muscle use or stridor.    Cardiac: Normal S1, S2 without murmur, rub, gallop or extrasystole. No JVD, Carotids without bruits.    Abdomen: Soft, nontender, active bowel sounds.  No palpable organomegaly.  No rebound or guarding.  No CVA tenderness.    Genitourinary: Exam deferred.    Musculoskeletal: Full range of motion.  Pulses full and equal. No cyanosis, clubbing, Left foot with ecchymosis on the dorsal aspect.  TTP, 2+ pulses, neurovascular intact  Neurological:  Cranial nerves II through XII are grossly intact, finger-nose touch is normal, normal sensation, no weakness, no focal findings identified.    Psychiatric: Appropriate mood and affect.     Medical Decision-Making:  Testing: Labs were obtained.  Patient has a negative troponin and normal lactate.  Also checked electrolytes.  Creatinine is 1.2.  She has a normal INR.  She is not any no blood thinners.  White count of 7 without a leukocytosis and her hemoglobin is 13. Patient underwent a CT of the head, C-spine chest 7 pelvis L-spine and T-spine.  No acute intracranial or intra cervical trauma.  She has mild compression fractures of L4 and L5 with acute to/acute appearance.  Patient has known other previous compression fractures with kyphoplasty noted.  She is neurovascularly intact.  She also has an x-ray of the femur which was read by radiology as no acute findings.  X-rays of the foot show that she has metatarsal neck fractures of 2 through 5.  Given these findings I did discuss this with the podiatrist on-call.  She is recommending that the patient just be in a splint.  Patient is already nonweightbearing.  She will follow-up.  Patient  is neurovascular intact.  Patient's  is requesting that the patient be sent back to the facility  Treatment:   Reevaluation:   Plan: Homegoing. Discussed differential. Will follow-up with the primary physician in the next 2-3 days. Return if worse. They understand return precautions and discharge instructions. Patient and family/friend/caregiver are in agreement with this plan.   Impression:   1. Foot fracture  2.Compression fracture    Labs Reviewed  COMPREHENSIVE METABOLIC PANEL - Abnormal     Glucose                       91                     Sodium                        137                    Potassium                     4.4                    Chloride                      104                    Bicarbonate                   28                     Anion Gap                     9 (*)                  Urea Nitrogen                 15                     Creatinine                    1.21 (*)               eGFR                          45 (*)                 Calcium                       8.7                    Albumin                       3.6                    Alkaline Phosphatase          87                     Total Protein                 6.4                    AST                           16                     Bilirubin, Total              0.7                    ALT                           10                  LACTATE - Normal     Lactate                       0.6                      Narrative: Venipuncture immediately after or during the administration of Metamizole may lead to falsely low results. Testing should be performed immediately prior to Metamizole dosing.  PROTIME-INR - Normal     Protime                       11.5                   INR                           1.0                 TROPONIN I, HIGH SENSITIVITY - Normal     Troponin I, High Sensitivity   8                        Narrative: Less than 99th percentile of normal range cutoff-                Female and children under 18  years old <14 ng/L; Male <21 ng/L: Negative                Repeat testing should be performed if clinically indicated.                                 Female and children under 18 years old 14-50 ng/L; Male 21-50 ng/L:                Consistent with possible cardiac damage and possible increased clinical                 risk. Serial measurements may help to assess extent of myocardial damage.                                 >50 ng/L: Consistent with cardiac damage, increased clinical risk and                myocardial infarction. Serial measurements may help assess extent of                 myocardial damage.                                  NOTE: Children less than 1 year old may have higher baseline troponin                 levels and results should be interpreted in conjunction with the overall                 clinical context.                                 NOTE: Troponin I testing is performed using a different                 testing methodology at [facility] than at other                 [facility]. Direct result comparisons should only                 be made within the same method.  CBC WITH AUTO DIFFERENTIAL     WBC                           7.0                    nRBC                          0.0                    RBC                           4.24                   Hemoglobin                    13.1                   Hematocrit                    39.8                   MCV                           94                     MCH                           30.9                   MCHC                          32.9                   RDW                           13.2                   Platelets                     229                    Neutrophils %                 70.3                   Immature Granulocytes %, Automated   0.4                    Lymphocytes %                 19.3                   Monocytes %                   7.1                    Eosinophils %                 2.3                     Basophils %                   0.6                    Neutrophils Absolute          4.93                   Immature Granulocytes Absolute, Au*   0.03                   Lymphocytes Absolute          1.35                   Monocytes Absolute            0.50                   Eosinophils Absolute          0.16                   Basophils Absolute            0.04                TYPE AND SCREEN     CT chest abdomen pelvis wo IV contrast   Final Result          Mild compression fractures at L4 and L5 with an acute-subacute    appearance without significant retropulsion. Prior L1 cement    vertebroplasty with extrusion of cement into central canal causing    mild-to-moderate stenosis.          No additional acute fracture or acute traumatic injury within the    chest, abdomen and pelvis otherwise.          MACRO    None          Signed by: Julienne Sandhu 6/7/2025 3:42 PM    Dictation workstation:   [id number]     CT lumbar spine retrospective reconstruction protocol   Final Result          Mild compression fractures at L4 and L5 with an acute-subacute    appearance without significant retropulsion. Prior L1 cement    vertebroplasty with extrusion of cement into central canal causing    mild-to-moderate stenosis.          No additional acute fracture or acute traumatic injury within the    chest, abdomen and pelvis otherwise.          MACRO    None          Signed by: Julienne Sandhu 6/7/2025 3:42 PM    Dictation workstation:   [id number]     CT thoracic spine retrospective reconstruction protocol   Final Result          Mild compression fractures at L4 and L5 with an acute-subacute    appearance without significant retropulsion. Prior L1 cement    vertebroplasty with extrusion of cement into central canal causing    mild-to-moderate stenosis.          No additional acute fracture or acute traumatic injury within the    chest, abdomen and pelvis otherwise.          MACRO    None          Signed by: uJlienne  Edson 6/7/2025 3:42 PM    Dictation workstation:   [id number]     CT head W O contrast trauma protocol   Final Result    No CT evidence of acute intracranial abnormality.          No acute calvarial fracture.          Chronic senescent changes including white matter disease commonly    attributed to chronic microvascular ischemia and age-appropriate    volume loss.          MACRO:    None                Signed by: Humberto Chandler 6/7/2025 2:38 PM    Dictation workstation:   [id number]     CT cervical spine wo IV contrast   Final Result    No evidence of acute fracture or traumatic malalignment of the    cervical spine.          Mild-to-moderate multilevel cervical spondylosis without advanced    bony foraminal or canal stenosis.          MACRO:    None          Signed by: Humberto Chandler 6/7/2025 2:46 PM    Dictation workstation:   [id number]     XR foot left 3+ views   Final Result    Mildly displaced and impacted fractures at the 2nd through 5th    metatarsal neck with lateral angulation of the distal fragments.          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          Severe osteopenia.          Severe soft tissue swelling of the forefoot surrounding the distal    metatarsals.                MACRO:    None          Signed by: Heber Valentine 6/7/2025 1:12 PM    Dictation workstation:   [id number]     XR femur right 2+ views   Final Result    Severe tricompartmental joint space narrowing of the right knee.    There is obliteration of the patellofemoral joint.          Anterior soft tissue swelling at the knee.          No plain film sign of acute fracture or dislocation. Osteopenia    limits assessment for nondisplaced fracture.                MACRO:    None          Signed by: Heber Valentine 6/7/2025 1:14 PM    Dictation workstation:   [id number]                    Patient History   Medical History[1]  Surgical History[2]  Family History[3]  Social History[4]    Physical Exam   ED Triage  Vitals [06/07/25 1101]   Temperature Heart Rate Respirations BP   36.3 °C (97.3 °F) 50 18 133/51      Pulse Ox Temp Source Heart Rate Source Patient Position   94 % Temporal Monitor Lying      BP Location FiO2 (%)     Right arm --       Physical Exam      ED Course & MDM   ED Course as of 06/07/25 1821   Sat Jun 07, 2025   1555 CT cervical spine wo IV contrast [MQ]      ED Course User Index  [MQ] Mehnaz Berry MD         Diagnoses as of 06/07/25 1821   Closed nondisplaced fracture of metatarsal bone of left foot, unspecified metatarsal, initial encounter   Closed compression fracture of lumbosacral spine, initial encounter (Multi)                 No data recorded     Lauri Coma Scale Score: 14 (06/07/25 1716 : Gloria Ceja LPN)                           Medical Decision Making      Procedure  Procedures         [1] No past medical history on file.  [2] No past surgical history on file.  [3] No family history on file.  [4]   Social History  Tobacco Use    Smoking status: Not on file    Smokeless tobacco: Not on file   Substance Use Topics    Alcohol use: Not on file    Drug use: Not on file        Mehnaz Berry MD  06/07/25 1821

## 2025-06-07 NOTE — ED PROCEDURE NOTE
Procedure  Orthopaedic Injury Treatment - Lower Extremity    Performed by: Mehnaz Berry MD  Authorized by: Mehnaz Berry MD    Consent:     Consent obtained:  Verbal    Consent given by:  Patient    Risks discussed:  Fracture  Universal protocol:     Imaging studies available: yes      Patient identity confirmed:  Verbally with patient, arm band and hospital-assigned identification number  Location:     Injury location: foot.  Pre-procedure details:     Distal perfusion: normal      Range of motion: normal    Sedation:     Sedation type:  None  Procedure details:     Immobilization:  Splint    Splint type:  Short leg    Supplies used:  Plaster  Post-procedure details:     Neurological function: normal      Distal perfusion: normal      Range of motion: normal      Procedure completion:  Tolerated  Comments:      Splint was placed by the nursing staff.  Patient was neurovascular intact before and after splint placement               Mehnaz Berry MD  06/07/25 2661

## 2025-06-10 ENCOUNTER — OFFICE VISIT (OUTPATIENT)
Dept: ORTHOPEDIC SURGERY | Facility: CLINIC | Age: 80
End: 2025-06-10
Payer: MEDICARE

## 2025-06-10 DIAGNOSIS — S92.321A CLOSED DISPLACED FRACTURE OF SECOND METATARSAL BONE OF RIGHT FOOT, INITIAL ENCOUNTER: ICD-10-CM

## 2025-06-10 DIAGNOSIS — S32.050A CLOSED COMPRESSION FRACTURE OF L5 LUMBAR VERTEBRA, INITIAL ENCOUNTER (MULTI): ICD-10-CM

## 2025-06-10 DIAGNOSIS — S32.040A CLOSED COMPRESSION FRACTURE OF L4 VERTEBRA, INITIAL ENCOUNTER (MULTI): ICD-10-CM

## 2025-06-10 DIAGNOSIS — S92.354A NONDISPLACED FRACTURE OF FIFTH METATARSAL BONE, RIGHT FOOT, INITIAL ENCOUNTER FOR CLOSED FRACTURE: ICD-10-CM

## 2025-06-10 DIAGNOSIS — S92.334A NONDISPLACED FRACTURE OF THIRD METATARSAL BONE, RIGHT FOOT, INITIAL ENCOUNTER FOR CLOSED FRACTURE: ICD-10-CM

## 2025-06-10 DIAGNOSIS — S92.344A NONDISPLACED FRACTURE OF FOURTH METATARSAL BONE, RIGHT FOOT, INITIAL ENCOUNTER FOR CLOSED FRACTURE: ICD-10-CM

## 2025-06-10 PROCEDURE — 28470 CLTX METATARSAL FX WO MNP EA: CPT | Performed by: STUDENT IN AN ORGANIZED HEALTH CARE EDUCATION/TRAINING PROGRAM

## 2025-06-10 PROCEDURE — 99204 OFFICE O/P NEW MOD 45 MIN: CPT | Performed by: STUDENT IN AN ORGANIZED HEALTH CARE EDUCATION/TRAINING PROGRAM

## 2025-06-10 PROCEDURE — L3260 AMBULATORY SURGICAL BOOT EAC: HCPCS | Performed by: STUDENT IN AN ORGANIZED HEALTH CARE EDUCATION/TRAINING PROGRAM

## 2025-06-10 RX ORDER — TRAMADOL HYDROCHLORIDE 50 MG/1
50 TABLET, FILM COATED ORAL EVERY 8 HOURS PRN
Qty: 20 TABLET | Refills: 0 | Status: SHIPPED | OUTPATIENT
Start: 2025-06-10 | End: 2025-06-17

## 2025-06-10 NOTE — PROGRESS NOTES
Acute Injury New Patient Visit    Patient ID: Ingrid Mckeon is a 80 y.o. female.   History of Present Illness  The patient is an 80-year-old female with a history of dementia who presents with a left foot injury sustained on 06/07/2025. She was seen at the ER for a fall at the nursing home, resulting in mildly displaced and impacted 2nd through 5th metatarsal neck fractures.    Left Foot Injury  - Fell a few days prior while attempting to rise from her chair, leading to fractures in her left foot  - Reports no incontinence, difficulty in leg movement, or numbness in the groin area  - First time out of bed since the incident  - Mobility is primarily wheelchair-dependent with minimal ambulation  - Pain is manageable, previously rated as 7 in the ER  - Initially had difficulty eating without tilting her head, now improved  - No medication prescribed upon ER discharge  - Uncertain about vitamin D and calcium intake  - Reports mild ankle pain    Back Pain  - Attributed to two compression fractures in her back    Supplemental information: None       Assessment & Plan  1. Fractures of the left 2nd, 3rd, 4th, and 5th metatarsal necks:  - Provide postoperative shoe for weight-bearing, removable for rest and showering  - Prescribed tramadol 50 mg every 8 hours as needed for severe pain, 20 tablets  - Advise ice and elevation  - If fracture blister ruptures, allow to drain, cover, keep clean and dry  - If postoperative shoe not tolerated, consider boot or cast    2. L4 and L5 fractures:  - Uncertain if old or new  - Referred to Dr. Paulson at [facility] for further evaluation    Follow-up:  - Follow-up in 4 weeks or sooner if necessary with repeat x-rays of the left foot       Assessment:   Problem List Items Addressed This Visit    None  Visit Diagnoses         Closed compression fracture of L4 vertebra, initial encounter (Multi)        Relevant Orders    Referral to Pain Management      Closed compression  fracture of L5 lumbar vertebra, initial encounter (Multi)        Relevant Orders    Referral to Pain Management      Closed displaced fracture of second metatarsal bone of right foot, initial encounter        Relevant Medications    traMADol (Ultram) 50 mg tablet    Other Relevant Orders    Post-op shoe      Nondisplaced fracture of third metatarsal bone, right foot, initial encounter for closed fracture        Relevant Medications    traMADol (Ultram) 50 mg tablet    Other Relevant Orders    Post-op shoe      Nondisplaced fracture of fourth metatarsal bone, right foot, initial encounter for closed fracture        Relevant Medications    traMADol (Ultram) 50 mg tablet    Other Relevant Orders    Post-op shoe      Nondisplaced fracture of fifth metatarsal bone, right foot, initial encounter for closed fracture        Relevant Medications    traMADol (Ultram) 50 mg tablet    Other Relevant Orders    Post-op shoe            Diagnostics: Reviewed all relevant imaging including x-ray, MRI, CT, and US.    Results  Imaging   - X-ray of the left foot: 06/07/2025, Mildly displaced and impacted 2nd through 5th metatarsal neck fractures     Procedure:  Procedures  Physical Exam  - Integumentary: Fracture blister on the foot    - Musculoskeletal:    - Left foot: Black and blue discoloration on the toes, mildly displaced and impacted 2nd through 5th metatarsal neck fractures, minimally tender to palpation over the distal metatarsals, small fracture blister over the dorsum of the foot overlying the 3rd and 4th metatarsals    - Back: No bruising       Orders Placed This Encounter    Post-op shoe    Referral to Pain Management    traMADol (Ultram) 50 mg tablet      At the conclusion of the visit there were no further questions by the patient/family regarding their plan of care.  Patient was instructed to call or return with any issues, questions, or concerns regarding their injury and/or treatment plan described above.     06/10/25  at 4:18 PM - Francisco J Coats DO    Office: [phone]    This note was prepared using voice recognition software.  The details of this note are correct and have been reviewed, and corrected to the best of my ability.  Some grammatical errors may persist related to the Dragon software.  This medical note was created with the assistance of artificial intelligence (AI) for documentation purposes. The content has been reviewed and confirmed by the healthcare provider for accuracy and completeness. Patient consented to the use of audio recording and use of AI during their visit.       No

## 2025-06-19 ENCOUNTER — INITIAL CONSULT (OUTPATIENT)
Age: 80
End: 2025-06-19
Payer: MEDICARE

## 2025-06-19 VITALS
BODY MASS INDEX: 28.32 KG/M2 | DIASTOLIC BLOOD PRESSURE: 50 MMHG | SYSTOLIC BLOOD PRESSURE: 102 MMHG | TEMPERATURE: 97.1 F | OXYGEN SATURATION: 96 % | HEIGHT: 65 IN | HEART RATE: 78 BPM | WEIGHT: 170 LBS

## 2025-06-19 DIAGNOSIS — S22.000D COMPRESSION FRACTURE OF THORACIC VERTEBRA WITH ROUTINE HEALING, UNSPECIFIED THORACIC VERTEBRAL LEVEL, SUBSEQUENT ENCOUNTER: Primary | ICD-10-CM

## 2025-06-19 DIAGNOSIS — S32.000D COMPRESSION FRACTURE OF LUMBAR VERTEBRA WITH ROUTINE HEALING, UNSPECIFIED LUMBAR VERTEBRAL LEVEL, SUBSEQUENT ENCOUNTER: ICD-10-CM

## 2025-06-19 PROCEDURE — G8419 CALC BMI OUT NRM PARAM NOF/U: HCPCS | Performed by: PAIN MEDICINE

## 2025-06-19 PROCEDURE — G8399 PT W/DXA RESULTS DOCUMENT: HCPCS | Performed by: PAIN MEDICINE

## 2025-06-19 PROCEDURE — 1159F MED LIST DOCD IN RCRD: CPT | Performed by: PAIN MEDICINE

## 2025-06-19 PROCEDURE — 1125F AMNT PAIN NOTED PAIN PRSNT: CPT | Performed by: PAIN MEDICINE

## 2025-06-19 PROCEDURE — 99204 OFFICE O/P NEW MOD 45 MIN: CPT | Performed by: PAIN MEDICINE

## 2025-06-19 PROCEDURE — 3078F DIAST BP <80 MM HG: CPT | Performed by: PAIN MEDICINE

## 2025-06-19 PROCEDURE — 1090F PRES/ABSN URINE INCON ASSESS: CPT | Performed by: PAIN MEDICINE

## 2025-06-19 PROCEDURE — G8427 DOCREV CUR MEDS BY ELIG CLIN: HCPCS | Performed by: PAIN MEDICINE

## 2025-06-19 PROCEDURE — 3074F SYST BP LT 130 MM HG: CPT | Performed by: PAIN MEDICINE

## 2025-06-19 PROCEDURE — 1036F TOBACCO NON-USER: CPT | Performed by: PAIN MEDICINE

## 2025-06-19 PROCEDURE — 99203 OFFICE O/P NEW LOW 30 MIN: CPT | Performed by: PAIN MEDICINE

## 2025-06-19 PROCEDURE — 1123F ACP DISCUSS/DSCN MKR DOCD: CPT | Performed by: PAIN MEDICINE

## 2025-06-19 NOTE — PROGRESS NOTES
[facility]  [facility]  P: [phone]  F: [phone]        Keyona Currie  (1945)    6/19/2025    Subjective:     Keyona Currie is 80 y.o. female who complains today of:     Chief Complaint   Patient presents with    Consultation    Foot Injury    Back Pain       Patient here today for initial evaluation requested by Dr. Morales.  Patient lives in a assisted care facility she has dementia she is at [facility].  History of falls compression fractures fractured foot.  She is essentially wheelchair-bound a lot of the history comes from her .  She is getting Percocet from Dr. Murcia at the care facility which is helping her pain to a degree history of L1 vertebroplasty CAT scan recently performed shows per the report mild compression fractures L4 and L5 with an acute to subacute appearance.  Patient has some pain in the lower thoracic lumbar region.  The Percocet is helping.    Assessment: Chronic pain syndrome, multimedical problems, lumbar thoracic compression fracture history, L1 vertebroplasty history    Plan: We discussed options.  Meds were reviewed.  She can continue Percocet that Dr. Murcia is prescribing her.  OARRS was reviewed.  Will order MRI thoracic and lumbar spine to see if she needs to see neurosurgery perhaps for any vertebroplasty interventions.  Questions answered chart was reviewed they are in agreement plan.  Allergies:  Aquamed, Aspirin-dipyridamole er, Celebrex [celecoxib], Ciprofloxacin, Contrast [iodides], Emollient, Erythromycin, Iodine, Mercury, Other, Penicillamine, Povidone iodine, Sulfa antibiotics, Zinc, Penicillins, Phenylmercuric nitrate, and Povidone-iodine    Past Medical History:   Diagnosis Date    Chronic cough     Depression     Diverticulosis     Essential hypertension     somewhat labile, but overall stable    Hyperlipidemia     Type2A    Low bone mass     spine    Menopause

## 2025-07-09 ENCOUNTER — APPOINTMENT (OUTPATIENT)
Dept: ORTHOPEDIC SURGERY | Facility: CLINIC | Age: 80
End: 2025-07-09
Payer: MEDICARE

## 2025-07-31 ENCOUNTER — HOSPITAL ENCOUNTER (OUTPATIENT)
Dept: GENERAL RADIOLOGY | Age: 80
Discharge: HOME | End: 2025-08-02
Payer: MEDICARE

## 2025-07-31 ENCOUNTER — TRANSCRIBE ORDERS (OUTPATIENT)
Dept: GENERAL RADIOLOGY | Age: 80
End: 2025-07-31

## 2025-07-31 ENCOUNTER — HOSPITAL ENCOUNTER (OUTPATIENT)
Age: 80
Discharge: HOME | End: 2025-08-02
Payer: MEDICARE

## 2025-07-31 DIAGNOSIS — M84.375A STRESS FRACTURE OF LEFT FOOT, INITIAL ENCOUNTER: ICD-10-CM

## 2025-07-31 DIAGNOSIS — R60.0 LOCALIZED EDEMA: ICD-10-CM

## 2025-07-31 DIAGNOSIS — M79.672 LEFT FOOT PAIN: ICD-10-CM

## 2025-07-31 DIAGNOSIS — M84.375A STRESS FRACTURE OF LEFT FOOT, INITIAL ENCOUNTER: Primary | ICD-10-CM

## 2025-07-31 PROCEDURE — 73630 X-RAY EXAM OF FOOT: CPT
